# Patient Record
Sex: MALE | Race: WHITE | NOT HISPANIC OR LATINO | URBAN - NONMETROPOLITAN AREA
[De-identification: names, ages, dates, MRNs, and addresses within clinical notes are randomized per-mention and may not be internally consistent; named-entity substitution may affect disease eponyms.]

---

## 2017-04-13 ENCOUNTER — HOSPITAL ENCOUNTER (OUTPATIENT)
Dept: GENERAL RADIOLOGY | Facility: HOSPITAL | Age: 32
Discharge: HOME | End: 2017-04-13
Admitting: NURSE PRACTITIONER

## 2017-04-13 ENCOUNTER — TRANSCRIBE ORDERS (OUTPATIENT)
Dept: GENERAL RADIOLOGY | Facility: HOSPITAL | Age: 32
End: 2017-04-13

## 2017-04-13 ENCOUNTER — LAB (OUTPATIENT)
Dept: LAB | Facility: HOSPITAL | Age: 32
End: 2017-04-13

## 2017-04-13 ENCOUNTER — APPOINTMENT (OUTPATIENT)
Dept: LAB | Facility: HOSPITAL | Age: 32
End: 2017-04-13

## 2017-04-13 DIAGNOSIS — S67.02XA CRUSHING INJURY OF LEFT THUMB, INITIAL ENCOUNTER: ICD-10-CM

## 2017-04-13 DIAGNOSIS — S67.02XA: ICD-10-CM

## 2017-04-13 DIAGNOSIS — S80.10XA: ICD-10-CM

## 2017-04-13 DIAGNOSIS — S80.10XA: Primary | ICD-10-CM

## 2017-04-13 LAB
ALBUMIN SERPL-MCNC: 4.2 G/DL (ref 3.5–5)
ALBUMIN/GLOB SERPL: 1.3 G/DL (ref 1.1–2.5)
ALP SERPL-CCNC: 75 U/L (ref 24–120)
ALT SERPL W P-5'-P-CCNC: 49 U/L (ref 0–54)
ANION GAP SERPL CALCULATED.3IONS-SCNC: 9 MMOL/L (ref 4–13)
APTT PPP: 25.8 SECONDS (ref 24.1–34.8)
AST SERPL-CCNC: 46 U/L (ref 7–45)
AUTO MIXED CELLS #: 0.4 10*3/UL (ref 0.1–2.6)
AUTO MIXED CELLS %: 8.8 % (ref 0.1–24)
BILIRUB SERPL-MCNC: 0.6 MG/DL (ref 0.1–1)
BUN BLD-MCNC: 12 MG/DL (ref 5–21)
BUN/CREAT SERPL: 14.6
CALCIUM SPEC-SCNC: 9 MG/DL (ref 8.4–10.4)
CHLORIDE SERPL-SCNC: 99 MMOL/L (ref 98–110)
CO2 SERPL-SCNC: 32 MMOL/L (ref 24–31)
CREAT BLD-MCNC: 0.82 MG/DL (ref 0.5–1.4)
ERYTHROCYTE [DISTWIDTH] IN BLOOD BY AUTOMATED COUNT: 12.4 % (ref 12–15)
GFR SERPL CREATININE-BSD FRML MDRD: 111 ML/MIN/1.73
GLOBULIN UR ELPH-MCNC: 3.2 GM/DL
GLUCOSE BLD-MCNC: 81 MG/DL (ref 70–100)
HCT VFR BLD AUTO: 42.9 % (ref 40–52)
HGB BLD-MCNC: 15.1 G/DL (ref 14–18)
INR PPP: 0.85 (ref 0.91–1.09)
LYMPHOCYTES # BLD AUTO: 1.1 10*3/MM3 (ref 0.8–7)
LYMPHOCYTES NFR BLD AUTO: 27.8 % (ref 15–45)
MCH RBC QN AUTO: 33.3 PG (ref 28–32)
MCHC RBC AUTO-ENTMCNC: 35.2 G/DL (ref 33–36)
MCV RBC AUTO: 94.5 FL (ref 82–95)
NEUTROPHILS # BLD AUTO: 2.5 10*3/MM3 (ref 1.5–8.3)
NEUTROPHILS NFR BLD AUTO: 63.4 % (ref 39–78)
PLATELET # BLD AUTO: 157 10*3/MM3 (ref 130–400)
PMV BLD AUTO: 9.9 FL (ref 6–12)
POTASSIUM BLD-SCNC: 4.6 MMOL/L (ref 3.5–5.3)
PROT SERPL-MCNC: 7.4 G/DL (ref 6.3–8.7)
PROTHROMBIN TIME: 11.9 SECONDS (ref 11.9–14.6)
RBC # BLD AUTO: 4.54 10*6/MM3 (ref 4.2–5.4)
SODIUM BLD-SCNC: 140 MMOL/L (ref 135–145)
WBC NRBC COR # BLD: 4 10*3/MM3 (ref 4.8–10.8)

## 2017-04-13 PROCEDURE — 73130 X-RAY EXAM OF HAND: CPT

## 2017-04-13 PROCEDURE — 36415 COLL VENOUS BLD VENIPUNCTURE: CPT

## 2017-04-13 PROCEDURE — 85610 PROTHROMBIN TIME: CPT

## 2017-04-13 PROCEDURE — 85025 COMPLETE CBC W/AUTO DIFF WBC: CPT

## 2017-04-13 PROCEDURE — 85730 THROMBOPLASTIN TIME PARTIAL: CPT

## 2017-04-13 PROCEDURE — 80053 COMPREHEN METABOLIC PANEL: CPT

## 2021-06-06 ENCOUNTER — APPOINTMENT (OUTPATIENT)
Dept: CT IMAGING | Age: 36
End: 2021-06-06
Attending: EMERGENCY MEDICINE
Payer: OTHER GOVERNMENT

## 2021-06-06 ENCOUNTER — HOSPITAL ENCOUNTER (EMERGENCY)
Age: 36
Discharge: HOME | End: 2021-06-06
Attending: EMERGENCY MEDICINE
Payer: OTHER GOVERNMENT

## 2021-06-06 VITALS
WEIGHT: 178 LBS | BODY MASS INDEX: 26.98 KG/M2 | HEART RATE: 54 BPM | TEMPERATURE: 97.7 F | OXYGEN SATURATION: 98 % | HEIGHT: 68 IN | DIASTOLIC BLOOD PRESSURE: 89 MMHG | SYSTOLIC BLOOD PRESSURE: 142 MMHG | RESPIRATION RATE: 16 BRPM

## 2021-06-06 DIAGNOSIS — R10.12 ABDOMINAL PAIN, LUQ (LEFT UPPER QUADRANT): Primary | ICD-10-CM

## 2021-06-06 DIAGNOSIS — K29.90 GASTRITIS AND DUODENITIS: ICD-10-CM

## 2021-06-06 LAB
ALBUMIN SERPL-MCNC: 4 G/DL (ref 3.5–5)
ALBUMIN/GLOB SERPL: 1.1 {RATIO} (ref 1.2–3.5)
ALP SERPL-CCNC: 89 U/L (ref 50–136)
ALT SERPL-CCNC: 97 U/L (ref 12–65)
ANION GAP SERPL CALC-SCNC: 4 MMOL/L (ref 7–16)
AST SERPL-CCNC: 55 U/L (ref 15–37)
BASOPHILS # BLD: 0 K/UL (ref 0–0.2)
BASOPHILS NFR BLD: 1 % (ref 0–2)
BILIRUB SERPL-MCNC: 0.3 MG/DL (ref 0.2–1.1)
BUN SERPL-MCNC: 10 MG/DL (ref 6–23)
CALCIUM SERPL-MCNC: 8.8 MG/DL (ref 8.3–10.4)
CHLORIDE SERPL-SCNC: 106 MMOL/L (ref 98–107)
CO2 SERPL-SCNC: 30 MMOL/L (ref 21–32)
CREAT SERPL-MCNC: 0.9 MG/DL (ref 0.8–1.5)
DIFFERENTIAL METHOD BLD: ABNORMAL
EOSINOPHIL # BLD: 0.1 K/UL (ref 0–0.8)
EOSINOPHIL NFR BLD: 2 % (ref 0.5–7.8)
ERYTHROCYTE [DISTWIDTH] IN BLOOD BY AUTOMATED COUNT: 11.7 % (ref 11.9–14.6)
GLOBULIN SER CALC-MCNC: 3.6 G/DL (ref 2.3–3.5)
GLUCOSE SERPL-MCNC: 97 MG/DL (ref 65–100)
HCT VFR BLD AUTO: 44 % (ref 41.1–50.3)
HGB BLD-MCNC: 15 G/DL (ref 13.6–17.2)
IMM GRANULOCYTES # BLD AUTO: 0 K/UL (ref 0–0.5)
IMM GRANULOCYTES NFR BLD AUTO: 0 % (ref 0–5)
LIPASE SERPL-CCNC: 230 U/L (ref 73–393)
LYMPHOCYTES # BLD: 1.2 K/UL (ref 0.5–4.6)
LYMPHOCYTES NFR BLD: 29 % (ref 13–44)
MCH RBC QN AUTO: 32.4 PG (ref 26.1–32.9)
MCHC RBC AUTO-ENTMCNC: 34.1 G/DL (ref 31.4–35)
MCV RBC AUTO: 95 FL (ref 79.6–97.8)
MONOCYTES # BLD: 0.4 K/UL (ref 0.1–1.3)
MONOCYTES NFR BLD: 9 % (ref 4–12)
NEUTS SEG # BLD: 2.4 K/UL (ref 1.7–8.2)
NEUTS SEG NFR BLD: 58 % (ref 43–78)
NRBC # BLD: 0 K/UL (ref 0–0.2)
PLATELET # BLD AUTO: 208 K/UL (ref 150–450)
PMV BLD AUTO: 9.9 FL (ref 9.4–12.3)
POTASSIUM SERPL-SCNC: 4.2 MMOL/L (ref 3.5–5.1)
PROT SERPL-MCNC: 7.6 G/DL (ref 6.3–8.2)
RBC # BLD AUTO: 4.63 M/UL (ref 4.23–5.6)
SODIUM SERPL-SCNC: 140 MMOL/L (ref 138–145)
WBC # BLD AUTO: 4.2 K/UL (ref 4.3–11.1)

## 2021-06-06 PROCEDURE — 74011000636 HC RX REV CODE- 636: Performed by: EMERGENCY MEDICINE

## 2021-06-06 PROCEDURE — 80053 COMPREHEN METABOLIC PANEL: CPT

## 2021-06-06 PROCEDURE — 96374 THER/PROPH/DIAG INJ IV PUSH: CPT

## 2021-06-06 PROCEDURE — 99284 EMERGENCY DEPT VISIT MOD MDM: CPT

## 2021-06-06 PROCEDURE — 74176 CT ABD & PELVIS W/O CONTRAST: CPT

## 2021-06-06 PROCEDURE — 85025 COMPLETE CBC W/AUTO DIFF WBC: CPT

## 2021-06-06 PROCEDURE — 81003 URINALYSIS AUTO W/O SCOPE: CPT

## 2021-06-06 PROCEDURE — 96376 TX/PRO/DX INJ SAME DRUG ADON: CPT

## 2021-06-06 PROCEDURE — 96375 TX/PRO/DX INJ NEW DRUG ADDON: CPT

## 2021-06-06 PROCEDURE — 74011250636 HC RX REV CODE- 250/636: Performed by: EMERGENCY MEDICINE

## 2021-06-06 PROCEDURE — 83690 ASSAY OF LIPASE: CPT

## 2021-06-06 RX ORDER — OMEPRAZOLE 20 MG/1
20 CAPSULE, DELAYED RELEASE ORAL DAILY
Qty: 30 CAPSULE | Refills: 1 | Status: SHIPPED | OUTPATIENT
Start: 2021-06-06

## 2021-06-06 RX ORDER — ONDANSETRON 8 MG/1
8 TABLET, ORALLY DISINTEGRATING ORAL
Qty: 12 TABLET | Refills: 0 | Status: SHIPPED | OUTPATIENT
Start: 2021-06-06

## 2021-06-06 RX ORDER — MORPHINE SULFATE 4 MG/ML
4 INJECTION INTRAVENOUS
Status: COMPLETED | OUTPATIENT
Start: 2021-06-06 | End: 2021-06-06

## 2021-06-06 RX ORDER — SODIUM CHLORIDE 0.9 % (FLUSH) 0.9 %
5-10 SYRINGE (ML) INJECTION EVERY 8 HOURS
Status: DISCONTINUED | OUTPATIENT
Start: 2021-06-06 | End: 2021-06-07 | Stop reason: HOSPADM

## 2021-06-06 RX ORDER — SODIUM CHLORIDE 0.9 % (FLUSH) 0.9 %
5-10 SYRINGE (ML) INJECTION AS NEEDED
Status: DISCONTINUED | OUTPATIENT
Start: 2021-06-06 | End: 2021-06-07 | Stop reason: HOSPADM

## 2021-06-06 RX ORDER — ONDANSETRON 2 MG/ML
4 INJECTION INTRAMUSCULAR; INTRAVENOUS
Status: COMPLETED | OUTPATIENT
Start: 2021-06-06 | End: 2021-06-06

## 2021-06-06 RX ADMIN — MORPHINE SULFATE 4 MG: 4 INJECTION INTRAVENOUS at 18:06

## 2021-06-06 RX ADMIN — DIATRIZOATE MEGLUMINE AND DIATRIZOATE SODIUM 15 ML: 660; 100 LIQUID ORAL; RECTAL at 19:07

## 2021-06-06 RX ADMIN — MORPHINE SULFATE 4 MG: 4 INJECTION INTRAVENOUS at 20:09

## 2021-06-06 RX ADMIN — SODIUM CHLORIDE 1000 ML: 900 INJECTION, SOLUTION INTRAVENOUS at 18:06

## 2021-06-06 RX ADMIN — ONDANSETRON 4 MG: 2 INJECTION INTRAMUSCULAR; INTRAVENOUS at 18:06

## 2021-06-06 NOTE — ED TRIAGE NOTES
Pt ambulatory to triage. Pt states dx with pancreatitis last week in [place] while working. Pt states still has abd pain, n/v/d. Denies fever. Uses ETOH, usually daily and takes a few weeks off when returns home. Has no ETOH in 2-3 days. Denies withdrawal s/sx.

## 2021-06-06 NOTE — ED PROVIDER NOTES
Patient presents to the ER with complaints of abdominal pain. Patient reports recent diagnosis of alcoholic pancreatitis. This was done in outside facility. States he had been doing well but felt that his pain has been got worsened over the past 2 to 3 days. States pain in her epigastrium that radiates towards his left side. Denies any hematemesis or melena. The history is provided by the patient. Abdominal Pain   This is a new problem. The current episode started more than 1 week ago. The problem occurs constantly. The problem has been gradually worsening. The pain is located in the epigastric region and LUQ. The quality of the pain is aching and cramping. The pain is at a severity of 5/10. The pain is moderate. Associated symptoms include nausea and vomiting. Pertinent negatives include no anorexia, no fever, no flatus, no melena, no headaches, no chest pain and no testicular pain. The pain is worsened by drinking alcohol. The pain is relieved by nothing. His past medical history is significant for pancreatitis. No past medical history on file. No past surgical history on file. No family history on file. Social History     Socioeconomic History    Marital status: SINGLE     Spouse name: Not on file    Number of children: Not on file    Years of education: Not on file    Highest education level: Not on file   Occupational History    Not on file   Tobacco Use    Smoking status: Not on file   Substance and Sexual Activity    Alcohol use: Not on file    Drug use: Not on file    Sexual activity: Not on file   Other Topics Concern    Not on file   Social History Narrative    Not on file     Social Determinants of Health     Financial Resource Strain:     Difficulty of Paying Living Expenses:    Food Insecurity:     Worried About Running Out of Food in the Last Year:     [address] in the Last Year:    Transportation Needs:     Lack of Transportation (Medical):      Lack of Transportation (Non-Medical):    Physical Activity:     Days of Exercise per Week:     Minutes of Exercise per Session:    Stress:     Feeling of Stress :    Social Connections:     Frequency of Communication with Friends and Family:     Frequency of Social Gatherings with Friends and Family:     Attends [employer] Services:     Active Member of Clubs or Organizations:     Attends Club or Organization Meetings:     Marital Status:    Intimate Partner Violence:     Fear of Current or Ex-Partner:     Emotionally Abused:     Physically Abused:     Sexually Abused: ALLERGIES: Iodinated contrast media    Review of Systems   Constitutional: Negative for fatigue and fever. HENT: Negative for congestion, trouble swallowing and voice change. Eyes: Negative for photophobia and redness. Respiratory: Negative for chest tightness and shortness of breath. Cardiovascular: Negative for chest pain, palpitations and leg swelling. Gastrointestinal: Positive for abdominal pain, nausea and vomiting. Negative for anal bleeding, anorexia, flatus and melena. Endocrine: Negative for heat intolerance, polyphagia and polyuria. Genitourinary: Negative for flank pain, testicular pain and urgency. Musculoskeletal: Negative for gait problem and joint swelling. Skin: Negative for color change and pallor. Neurological: Negative for syncope, speech difficulty, weakness, light-headedness and headaches. Hematological: Negative for adenopathy. Does not bruise/bleed easily. Psychiatric/Behavioral: Negative for behavioral problems and confusion. All other systems reviewed and are negative. Vitals:    06/06/21 1638   BP: 123/67   Pulse: 67   Resp: 16   Temp: 97.7 °F (36.5 °C)   SpO2: 98%   Weight: 80.7 kg (178 lb)   Height: 5' 8\" (1.727 m)            Physical Exam  Vitals and nursing note reviewed. Constitutional:       General: He is not in acute distress. Appearance: Normal appearance.  He is not ill-appearing. HENT:      Head: Normocephalic and atraumatic. Nose: Nose normal. No congestion or rhinorrhea. Mouth/Throat:      Mouth: Mucous membranes are moist.      Pharynx: No oropharyngeal exudate or posterior oropharyngeal erythema. Eyes:      Extraocular Movements: Extraocular movements intact. Pupils: Pupils are equal, round, and reactive to light. Cardiovascular:      Rate and Rhythm: Normal rate and regular rhythm. Pulses: Normal pulses. Heart sounds: Normal heart sounds. No murmur heard. No friction rub. No gallop. Pulmonary:      Effort: Pulmonary effort is normal. No respiratory distress. Breath sounds: Normal breath sounds. No stridor. No wheezing. Abdominal:      General: Abdomen is flat. Palpations: There is no mass. Tenderness: There is abdominal tenderness. There is no guarding. Musculoskeletal:         General: No swelling, tenderness or deformity. Normal range of motion. Cervical back: Normal range of motion and neck supple. No rigidity or tenderness. Skin:     General: Skin is warm. Capillary Refill: Capillary refill takes less than 2 seconds. Coloration: Skin is not jaundiced or pale. Neurological:      General: No focal deficit present. Mental Status: He is alert and oriented to person, place, and time. Cranial Nerves: No cranial nerve deficit. Sensory: No sensory deficit. Motor: No weakness. Psychiatric:         Mood and Affect: Mood normal.         Behavior: Behavior normal.          MDM  Number of Diagnoses or Management Options  Diagnosis management comments: Will obtain labs here including lipase. Treat symptomatically      9:06 PM  Normal basic labs here. Lipase normal as well    Chemistry panel stable.     CT scan of abdomen pelvis shows no acute abnormalities    Plan to discharge home, given alcohol gastritis precautions       Amount and/or Complexity of Data Reviewed  Clinical lab tests: ordered and reviewed  Tests in the radiology section of CPT®: ordered and reviewed  Review and summarize past medical records: yes  Independent visualization of images, tracings, or specimens: yes    Risk of Complications, Morbidity, and/or Mortality  Presenting problems: moderate  Diagnostic procedures: moderate  Management options: high  General comments: High risk due to use of parenteral narcotics    Patient Progress  Patient progress: stable         Procedures          Results Include:    Recent Results (from the past 24 hour(s))   CBC WITH AUTOMATED DIFF    Collection Time: 06/06/21  4:41 PM   Result Value Ref Range    WBC 4.2 (L) 4.3 - 11.1 K/uL    RBC 4.63 4.23 - 5.6 M/uL    HGB 15.0 13.6 - 17.2 g/dL    HCT 44.0 41.1 - 50.3 %    MCV 95.0 79.6 - 97.8 FL    MCH 32.4 26.1 - 32.9 PG    MCHC 34.1 31.4 - 35.0 g/dL    RDW 11.7 (L) 11.9 - 14.6 %    PLATELET 954 583 - 323 K/uL    MPV 9.9 9.4 - 12.3 FL    ABSOLUTE NRBC 0.00 0.0 - 0.2 K/uL    DF AUTOMATED      NEUTROPHILS 58 43 - 78 %    LYMPHOCYTES 29 13 - 44 %    MONOCYTES 9 4.0 - 12.0 %    EOSINOPHILS 2 0.5 - 7.8 %    BASOPHILS 1 0.0 - 2.0 %    IMMATURE GRANULOCYTES 0 0.0 - 5.0 %    ABS. NEUTROPHILS 2.4 1.7 - 8.2 K/UL    ABS. LYMPHOCYTES 1.2 0.5 - 4.6 K/UL    ABS. MONOCYTES 0.4 0.1 - 1.3 K/UL    ABS. EOSINOPHILS 0.1 0.0 - 0.8 K/UL    ABS. BASOPHILS 0.0 0.0 - 0.2 K/UL    ABS. IMM. GRANS. 0.0 0.0 - 0.5 K/UL   METABOLIC PANEL, COMPREHENSIVE    Collection Time: 06/06/21  4:41 PM   Result Value Ref Range    Sodium 140 138 - 145 mmol/L    Potassium 4.2 3.5 - 5.1 mmol/L    Chloride 106 98 - 107 mmol/L    CO2 30 21 - 32 mmol/L    Anion gap 4 (L) 7 - 16 mmol/L    Glucose 97 65 - 100 mg/dL    BUN 10 6 - 23 MG/DL    Creatinine 0.90 0.8 - 1.5 MG/DL    GFR est AA >60 >60 ml/min/1.73m2    GFR est non-AA >60 >60 ml/min/1.73m2    Calcium 8.8 8.3 - 10.4 MG/DL    Bilirubin, total 0.3 0.2 - 1.1 MG/DL    ALT (SGPT) 97 (H) 12 - 65 U/L    AST (SGOT) 55 (H) 15 - 37 U/L    Alk. phosphatase 89 50 - 136 U/L    Protein, total 7.6 6.3 - 8.2 g/dL    Albumin 4.0 3.5 - 5.0 g/dL    Globulin 3.6 (H) 2.3 - 3.5 g/dL    A-G Ratio 1.1 (L) 1.2 - 3.5     LIPASE    Collection Time: 06/06/21  4:41 PM   Result Value Ref Range    Lipase 230 73 - 393 U/L     Voice dictation software was used during the making of this note. This software is not perfect and grammatical and other typographical errors may be present. This note has been proofread, but may still contain errors.   Perry Reese MD; 6/6/2021 @9:06 PM   ===================================================================

## 2021-06-07 NOTE — DISCHARGE INSTRUCTIONS
Take medications as prescribed  Call and arrange follow-up with your primary care physician  Return to the ER for any new, worsening or life-threatening symptoms

## 2021-06-07 NOTE — ED NOTES
I have reviewed discharge instructions with the patient. The patient verbalized understanding. Patient left ED via Discharge Method: ambulatory to Home. Opportunity for questions and clarification provided. Patient given 2 scripts. To continue your aftercare when you leave the hospital, you may receive an automated call from our care team to check in on how you are doing. This is a free service and part of our promise to provide the best care and service to meet your aftercare needs.  If you have questions, or wish to unsubscribe from this service please call [phone]. Thank you for Choosing our [facility].

## 2022-05-12 PROBLEM — M25.312 INSTABILITY OF LEFT SHOULDER JOINT: Status: ACTIVE | Noted: 2022-05-12

## 2022-06-03 PROBLEM — M25.512 LEFT SHOULDER PAIN: Status: ACTIVE | Noted: 2022-06-03

## 2022-06-24 ENCOUNTER — OFFICE VISIT (OUTPATIENT)
Dept: ORTHOPEDIC SURGERY | Age: 37
End: 2022-06-24
Payer: OTHER GOVERNMENT

## 2022-06-24 DIAGNOSIS — M25.512 LEFT SHOULDER PAIN, UNSPECIFIED CHRONICITY: ICD-10-CM

## 2022-06-24 DIAGNOSIS — M25.312 INSTABILITY OF LEFT SHOULDER JOINT: Primary | ICD-10-CM

## 2022-06-24 PROCEDURE — 99024 POSTOP FOLLOW-UP VISIT: CPT | Performed by: SPECIALIST/TECHNOLOGIST

## 2022-06-24 PROCEDURE — L3650 SO 8 ABD RESTRAINT PRE OTS: HCPCS | Performed by: SPECIALIST/TECHNOLOGIST

## 2022-06-24 RX ORDER — CITALOPRAM 40 MG/1
40 TABLET ORAL DAILY
COMMUNITY
Start: 2013-05-01

## 2022-06-24 RX ORDER — ACETAMINOPHEN 500 MG
500 TABLET ORAL EVERY 6 HOURS PRN
COMMUNITY

## 2022-06-24 RX ORDER — APIXABAN 5 MG/1
5 TABLET, FILM COATED ORAL 2 TIMES DAILY
COMMUNITY
Start: 2022-03-31

## 2022-06-24 RX ORDER — DIVALPROEX SODIUM 500 MG/1
500 TABLET, EXTENDED RELEASE ORAL
COMMUNITY
Start: 2016-08-01

## 2022-06-24 RX ORDER — OXYCODONE HYDROCHLORIDE 5 MG/1
5 TABLET ORAL EVERY 4 HOURS PRN
Qty: 30 TABLET | Refills: 0 | Status: SHIPPED | OUTPATIENT
Start: 2022-06-24 | End: 2022-06-29

## 2022-06-24 NOTE — PERIOP NOTE
Patient verified name and . Order for consent NOT found in EHR; patient verifies procedure from case posting. Type 1B surgery, phone assessment complete. Orders NOT received. Labs per surgeon: Unknown  Labs per anesthesia protocol: None per protocol    Patient was told by Hematologist to take last dose of Eliquis  night 2022, remain off Eliquis , , then Wednesday the day of surgery to take night time dose of Eliquis in the evening of 2022. Patient verbalizes understanding of all hematology instruction. Patient answered medical/surgical history questions at their best of ability. All prior to admission medications documented in [facility]. Patient instructed to take the following medications the day of surgery according to anesthesia guidelines with a small sip of water: citalopram, divalproex and oxycodone if needed day of surgery. On the day before surgery please take Acetaminophen 1000mg in the morning and then again before bed. You may substitute for Tylenol 650 mg. Hold all vitamins 7 days prior to surgery and NSAIDS 5 days prior to surgery. Prescription meds to hold: Eliquis (see above note for patient instructions from hematology). Patient instructed on the following:    > Arrive at [facility], time of arrival to be called the day before by 1700  > NPO after midnight, unless otherwise indicated, including gum, mints, and ice chips  > Responsible adult must drive patient to the hospital, stay during surgery, and patient will need supervision 24 hours after anesthesia  > Use antibacterial soap in shower the night before surgery and on the morning of surgery  > All piercings must be removed prior to arrival.    > Leave all valuables (money and jewelry) at home but bring insurance card and ID on DOS.   > You may be required to pay a deductible or co-pay on the day of your procedure.  You can pre-pay by calling [phone] if your surgery is at the [address] or [phone] if your surgery is at the [facility]. > Do not wear make-up, nail polish, lotions, cologne, perfumes, powders, or oil on skin. Artificial nails are not permitted. Emailed patient instruction note to: [email or web address]

## 2022-06-24 NOTE — PERIOP NOTE
Dear Olive Yashira Canales,      Thank you for completing your phone assessment with me today. Here are your requested surgery instructions. Please call #[phone] with any questions/concerns. Your surgery is scheduled at [facility]: [address], [place], [address]. Please arrive at Outpatient Entrance; pre-op (#[phone]) will call you on the business day before your surgery with your arrival time. If you have any questions on the day of surgery, please call the pre-op dept. at the telephone number above. If you are sick the day of surgery: fever >100 deg F, coughing up colored mucus, or have abdominal sickness (intractable nausea, vomiting or diarrhea) please call [phone] the day of surgery as early as possible and speak to a nurse about your symptoms. They will advise you on next steps. No food or drink after midnight which includes any gum, mints, candy, or ice chips. Please take these medications on the morning of surgery with a small sip of water: citalopram, depakote, and oxycodone if needed day of surgery and you can tolerate on empty stomach. On the day before surgery take Acetaminophen 1000mg in the morning and at bedtime OR Acetaminophen 650mg in the morning, afternoon and bedtime. Please stop all vitamins/supplements 7 days prior to surgery and stop all NSAIDS (ibuprofen, naproxen, aleve, motrin, advil) 5 days before your surgery. A responsible adult must drive you to the hospital, remain in the building during surgery and you will need adult supervision for 24 hours after anesthesia. Please use an antibacterial soap (Dial, Safeguard, etc.) the night before surgery and on the morning of surgery. Do NOT wear: deodorant, make-up, nail polish, lotions, cologne, perfumes, powders or oil on your skin.  All piercings/metal/jewelry must be removed prior to arrival.  If you wear contacts then you will need to bring a case to store them in or wear your glasses. Please leave all your valuables at home but be sure to bring your insurance card and ID on the day of surgery for registration/identification. Our Guide to Surgery with additional information can be found:  [email or web address]    Emailed to: [email or web address]

## 2022-06-24 NOTE — LETTER
DME Patient Authorization Form    Name: Jocelyn Tabares  : 1985  MRN: [id number]   Age: [address] y.o. Gender: male  Delivery Address: [facility]     Diagnosis:     ICD-10-CM    1. Instability of left shoulder joint  M25.312    2. Left shoulder pain, unspecified chronicity  M25.512         Requested DME:  Shoulder ImmobIlizer ()        Clinical Notes:     **Indicates non-covered items by insurance. Payment expected on date of service. Electronically signed by  Provider: _____________JAVY Cuello_____________ Date: 2022                             [facility] Tax ID # [id number]        Durable Medical Equipment and/or Orthotics Patient Consent     I understand that my physician has prescribed this medical supply as part of my treatment plan as a matter of Medical Necessity.  I understand that I have a choice in where I receive my prescribed orthopedic supplies and/or services.  I authorize [facility] to furnish this service/product and to provide my insurance carrier with any information requested in order to process for payment.  I instruct my insurance carrier to pay [facility] directly for these services/products.  I understand that my insurance carrier may deny payment for this supply because it is a non-covered item, deemed not medically necessary or considered experimental.   I understand that any cost not covered by my insurance carrier will be solely my financial responsibility.  I have received the Supplier Standards and have reviewed them.  I have received the prescribed item and have been fully instructed on the proper use of the above services/products.    ______ (Patient Initials) I understand that all DME items are non-returnable after being dispensed. Items still in sealed packaging may be returned up to 14 days after purchasing.  [facility] [address] will replace items that are defective.    ______ (Patient Initials) I understand that Walker Carrillo will not file a claim with my insurance carrier for this service/product and I am waiving my right to file a claim on my own for this service/product with my insurance company as this item is NON-COVERED (Denoted by the **) by my Insurance company/policy. ______ (Patient Initials) I understand that I am responsible to bring my equipment to the hospital for any surgery. ______________________________________________  ________________________  Patient / Fanny Goel            Thank you for considering [address] Your physician has prescribed specific medical equipment or devices for your home use. The following describes your rights and responsibilities as our customer. Right to Choose Providers: You have a choice regarding which company supplies your home medical equipment and devices, and to consult your physician in this decision. You may choose a medical supply store, a home medical equipment provider, or a specialist such as [facility]. [facility] will coordinate with your physician to provide the medical equipment or devices prescribed for your home use. Right to Service:  You have the right to considerate, respectful and nondiscriminatory care. You have the right to receive accurate and easily understood information about your health care. If you speak a foreign language, or don't understand the discussions, assistance will be provided to allow you to make informed health care decisions. You have the right to know your treatment options and to participate in decisions about your care, including the right to accept or refuse treatment. You have the right to expect a reasonable response to your requests for treatment or service.   You have the right to talk in confidence with health care providers and to have your health care information protected. You have the right to receive an explanation of your bill. You have the right to complain about the service or product you receive. Patient Responsibilities:  Please provide complete and accurate information about your health insurance benefits and make arrangements for the timely payment of your bill. [facility] will, if possible, assume responsibility for billing your insurance (Medicare, Medicaid and commercial) for the prescribed equipment or devices. If your policy does not cover the prescribed product, or only covers a portion of the bill, you are responsible for any remaining balance. Return and Exchange Policy:  [facility] will honor published  Warranties for products. [facility] will accept returns or exchanges within 14 days from the date of receipt, providin) the product must be in new condition; 2) receipt as required; and 3) used disposable and hygiene products may only be returned due to a defective product. Note: Refunds will be issued in a timely manner, please allow 4-6 weeks for processing. Complaint Procedures and DME Consumer Protection Resources:  [facility] values you as a customer, and is committed to resolving patient concerns. This commitment includes understanding and documenting your concerns, conducting a review of internal procedures, and providing you with an explanation and resolution to your concerns. Should you have any questions about our services or billing process, please contact our office at (practice phone number). If we are unable to resolve the concern, you have the right to direct comments to the office of Consumer Protection, in the [address] or the [facility] office, without fear of repercussion.     DMEPOS SUPPLIER STANDARDS    A supplier must be in compliance with all applicable Federal and [place] and regulatory requirements. A supplier must provide complete and accurate information on the DMEPOS supplier application. Any changes to this information must be reported to the [facility] within 30 days. An authorized individual (one whose signature is binding) must sign the application for billing privileges. A supplier must fill orders from its own inventory, or must contract with other companies for the purchase of items necessary to fill the order. A supplier may not contract with any entity that is currently excluded from the Medicare program, any [facility] program, or from any other Federal procurement or Nonprocurement programs. A supplier must advise beneficiaries that they may rent or purchase inexpensive or routinely purchased durable medical equipment, and of the purchase option for capped rental equipment. A supplier must notify beneficiaries of warranty coverage and honor all warranties under applicable State Law, and repair or replace free of charge Medicare covered items that are under warranty. A supplier must maintain a physical facility on an appropriate site. A supplier must permit CMS, or its agents to conduct on-site inspections to ascertain the supplier's compliance with these standards. The supplier location must be accessible to beneficiaries during reasonable business hours, and must maintain a visible sign and posted hours of operation. A supplier must maintain a primary business telephone listed under the name of the business in a [employer] or a toll free number available through directory assistance. The exclusive use of a beeper, answering machine or cell phone is prohibited. A supplier must have comprehensive liability insurance in the amount of at least $300,000 that covers both the supplier's place of business and all customers and employees of the supplier.   If the supplier manufactures its own items, this insurance must also cover product liability and completed operations. A supplier must agree not to initiate telephone contact with beneficiaries, with a few exceptions allowed. This standard prohibits suppliers from calling beneficiaries in order to solicit new business. A supplier is responsible for delivery and must instruct beneficiaries on use of Medicare covered items, and maintain proof of delivery. A supplier must answer questions, and respond to complaints of the beneficiaries, and maintain documentation of such contacts. A supplier must maintain and replace at no charge or repair directly, or through a service contract with another company, Medicare covered items it has rented to beneficiaries. A supplier must accept returns of substandard (less than full quality for the particular item) or unsuitable items (inappropriate for the beneficiary at the time it was fitted and rented or sold) from beneficiaries. A supplier must disclose these supplier standards to each beneficiary to whom it supplies a Medicare-covered item. A supplier must disclose to the government any person having ownership, financial, or control interest in the supplier. A supplier must not convey or reassign a supplier number; i.e., the supplier may not sell or allow another entity to use its Medicare billing number. A supplier must have a complaint resolution protocol established to address beneficiary complaints that relate to these standards. A record of these complaints must be maintained at the physical facility. Complaint records must include: the name, address, telephone number and health insurance claim number of the beneficiary, a summary of the complaint, and any action taken to resolve it. A supplier must agree to furnish CMS any information required by the Medicare statute and implementing regulations.   A supplier of DMEPOS and other items and services must be accredited by a CMS-approved accreditation organization in order to receive and retain a supplier billing number. The accreditation must indicate the specific products and services, for which the supplier is accredited in order for the supplier to receive payment for those specific products and services. A DMEPOS supplier must notify their accreditation organization when a new DMEPOS location is opened. The accreditation organization may accredit the new supplier location for three months after it is operational without requiring a new site visit. All DMEPOS supplier locations, whether owned or subcontracted, must meet the [employer] and be separately accredited in order to bill Medicare. An accredited supplier may be denied enrollment or their enrollment may be revoked, if CMS determines that they are not in compliance with the DMEPOS quality standards. A DMEPOS supplier must disclose upon enrollment all products and services, including the addition of new product lines for which they are seeking accreditation. If a new product line is added after enrollment, the DMEPOS supplier will be responsible for notifying the accrediting body of the new product so that the DMEPOS supplier can be re-surveyed and accredited for these new products. Must meet the surety bond requirements specified in 42 C. F.R. 424.57(c). Implementation date- May 4, 2009. A supplier must obtain oxygen from a state-licensed oxygen supplier. A supplier must maintain ordering and referring documentation consistent with provisions found in 42 C. F.R. 424.516(f). DMEPOS suppliers are prohibited from sharing a practice location with certain other Medicare providers and suppliers. DMEPOS suppliers must remain open to the public for a minimum of 30 hours per week with certain exceptions.

## 2022-06-24 NOTE — H&P (VIEW-ONLY)
of Exercise per Week: Not on file    Minutes of Exercise per Session: Not on file   Stress:     Feeling of Stress : Not on file   Social Connections:     Frequency of Communication with Friends and Family: Not on file    Frequency of Social Gatherings with Friends and Family: Not on file    Attends Judaism Services: Not on file    Active Member of [address] [place] [address] [employer] or Organizations: Not on file    Attends Club or Organization Meetings: Not on file    Marital Status: Not on file   Intimate Partner Violence:     Fear of Current or Ex-Partner: Not on file    Emotionally Abused: Not on file    Physically Abused: Not on file    Sexually Abused: Not on file   Housing Stability:     Unable to Pay for Housing in the Last Year: Not on file    Number of [place] in the Last Year: Not on file    Unstable Housing in the Last Year: Not on file           Physical Examination:  General: no acute distress  Lungs: breathing easily, CTAB  HEENT: NCAT  Abdomen: soft, non-tender  CV: regular rhythm by pulse, S1/S2 to auscultation  Left Shoulder: Tenderness to palpation of the anterior shoulder around the bicipital groove. Active forward flexion to 170 degrees with pain in the extreme. External rotation with elbows at side equal bilaterally. External rotation with elbows at side resisted range of motion 5/5 strength. Negative empty can test with 5/5 strength in the empty can position. Positive apprehension, Lyly relocation. Positive sulcus sign. Positive Centre's. Assessment:     ICD-10-CM    1. Instability of left shoulder joint  M25.312 Shoulder ImmobIlizer ()     External Referral to Physical Therapy   2. Left shoulder pain, unspecified chronicity  M25.512 Shoulder ImmobIlizer ()     External Referral to Physical Therapy       Plan:   Surgical plan will be for left shoulder arthroscopy with stabilization/labral repair.   He has been seen by hematology and the plan is to discontinue his Eliquis 72 hours prior to surgery while resuming Eliquis the night of surgery. Discussed with the patient the recovery and rehab involved with the procedure. I explained the importance of bringing all equipment with him to the day of surgery for placement in the OR. I will send the patient a postoperative pain prescription and instructed him to pick it up within the next 5 days to prevent expiration. We discussed the risks of the procedure including but not limited to infection, bleeding, anesthetic complications postoperative DVT/PE.   All of his questions have been answered and they elect to proceed as planned            AJVY Jacobson  Orthopaedics and Sports Medicine

## 2022-06-24 NOTE — PROGRESS NOTES
Name: Saida Collado  YOB: 1985  Gender: male  MRN: [id number]      CC: Follow-up (L shoulder pre-op)       HPI: Saida Collado is a [address] y.o. male who returns for follow up preoperative appointment on left shoulder pain. He is scheduled for a labral repair and capsulorrhaphy with Dr. Abner Guadalupe on 06/29/2022. Ports continued shoulder pain and instability and would like to proceed with surgery. Current Outpatient Medications:     citalopram (CELEXA) 40 MG tablet, Take 40 mg by mouth, Disp: , Rfl:     divalproex (DEPAKOTE ER) 500 MG extended release tablet, Take 500 mg by mouth, Disp: , Rfl:     ELIQUIS 5 MG TABS tablet, , Disp: , Rfl:     omeprazole (PRILOSEC) 20 MG delayed release capsule, Take 20 mg by mouth daily, Disp: , Rfl:     ondansetron (ZOFRAN-ODT) 8 MG TBDP disintegrating tablet, Take 8 mg by mouth every 8 hours as needed, Disp: , Rfl:   Not on File  No past medical history on file. No past surgical history on file. No family history on file. Social History     Socioeconomic History    Marital status: Single     Spouse name: Not on file    Number of children: Not on file    Years of education: Not on file    Highest education level: Not on file   Occupational History    Not on file   Tobacco Use    Smoking status: Never Smoker    Smokeless tobacco: Never Used   Substance and Sexual Activity    Alcohol use: Not on file    Drug use: Not on file    Sexual activity: Not on file   Other Topics Concern    Not on file   Social History Narrative    Not on file     Social Determinants of Health     Financial Resource Strain:     Difficulty of Paying Living Expenses: Not on file   Food Insecurity:     Worried About Running Out of Food in the Last Year: Not on file    Dennise of Food in the Last Year: Not on file   Transportation Needs:     Lack of Transportation (Medical): Not on file    Lack of Transportation (Non-Medical):  Not on file   Physical Activity:     Days of Exercise per Week: Not on file    Minutes of Exercise per Session: Not on file   Stress:     Feeling of Stress : Not on file   Social Connections:     Frequency of Communication with Friends and Family: Not on file    Frequency of Social Gatherings with Friends and Family: Not on file    Attends Cheondoism Services: Not on file    Active Member of [address] [place] [address] [employer] or Organizations: Not on file    Attends Club or Organization Meetings: Not on file    Marital Status: Not on file   Intimate Partner Violence:     Fear of Current or Ex-Partner: Not on file    Emotionally Abused: Not on file    Physically Abused: Not on file    Sexually Abused: Not on file   Housing Stability:     Unable to Pay for Housing in the Last Year: Not on file    Number of [place] in the Last Year: Not on file    Unstable Housing in the Last Year: Not on file           Physical Examination:  General: no acute distress  Lungs: breathing easily, CTAB  HEENT: NCAT  Abdomen: soft, non-tender  CV: regular rhythm by pulse, S1/S2 to auscultation  Left Shoulder: Tenderness to palpation of the anterior shoulder around the bicipital groove. Active forward flexion to 170 degrees with pain in the extreme. External rotation with elbows at side equal bilaterally. External rotation with elbows at side resisted range of motion 5/5 strength. Negative empty can test with 5/5 strength in the empty can position. Positive apprehension, Lyly relocation. Positive sulcus sign. Positive Patrick's. Assessment:     ICD-10-CM    1. Instability of left shoulder joint  M25.312 Shoulder ImmobIlizer ()     External Referral to Physical Therapy   2. Left shoulder pain, unspecified chronicity  M25.512 Shoulder ImmobIlizer ()     External Referral to Physical Therapy       Plan:   Surgical plan will be for left shoulder arthroscopy with stabilization/labral repair.   He has been seen by hematology and the plan is to discontinue his Eliquis 72

## 2022-06-28 ENCOUNTER — ANESTHESIA EVENT (OUTPATIENT)
Dept: SURGERY | Age: 37
End: 2022-06-28
Payer: OTHER GOVERNMENT

## 2022-06-29 ENCOUNTER — ANESTHESIA (OUTPATIENT)
Dept: SURGERY | Age: 37
End: 2022-06-29
Payer: OTHER GOVERNMENT

## 2022-06-29 ENCOUNTER — HOSPITAL ENCOUNTER (OUTPATIENT)
Age: 37
Setting detail: OUTPATIENT SURGERY
Discharge: HOME | End: 2022-06-29
Attending: ORTHOPAEDIC SURGERY | Admitting: ORTHOPAEDIC SURGERY
Payer: OTHER GOVERNMENT

## 2022-06-29 VITALS
SYSTOLIC BLOOD PRESSURE: 128 MMHG | TEMPERATURE: 98.3 F | WEIGHT: 180 LBS | DIASTOLIC BLOOD PRESSURE: 78 MMHG | HEART RATE: 97 BPM | HEIGHT: 68 IN | OXYGEN SATURATION: 95 % | BODY MASS INDEX: 27.28 KG/M2 | RESPIRATION RATE: 16 BRPM

## 2022-06-29 PROCEDURE — 6360000002 HC RX W HCPCS: Performed by: ANESTHESIOLOGY

## 2022-06-29 PROCEDURE — 2580000003 HC RX 258: Performed by: ANESTHESIOLOGY

## 2022-06-29 PROCEDURE — 29806 SHO ARTHRS SRG CAPSULORRAPHY: CPT | Performed by: ORTHOPAEDIC SURGERY

## 2022-06-29 PROCEDURE — 7100000001 HC PACU RECOVERY - ADDTL 15 MIN: Performed by: ORTHOPAEDIC SURGERY

## 2022-06-29 PROCEDURE — 6370000000 HC RX 637 (ALT 250 FOR IP): Performed by: ANESTHESIOLOGY

## 2022-06-29 PROCEDURE — 2720000010 HC SURG SUPPLY STERILE: Performed by: ORTHOPAEDIC SURGERY

## 2022-06-29 PROCEDURE — 2500000003 HC RX 250 WO HCPCS

## 2022-06-29 PROCEDURE — 7100000000 HC PACU RECOVERY - FIRST 15 MIN: Performed by: ORTHOPAEDIC SURGERY

## 2022-06-29 PROCEDURE — 76942 ECHO GUIDE FOR BIOPSY: CPT | Performed by: ANESTHESIOLOGY

## 2022-06-29 PROCEDURE — 6360000002 HC RX W HCPCS: Performed by: SPECIALIST/TECHNOLOGIST

## 2022-06-29 PROCEDURE — 3700000000 HC ANESTHESIA ATTENDED CARE: Performed by: ORTHOPAEDIC SURGERY

## 2022-06-29 PROCEDURE — 7100000011 HC PHASE II RECOVERY - ADDTL 15 MIN: Performed by: ORTHOPAEDIC SURGERY

## 2022-06-29 PROCEDURE — 3600000004 HC SURGERY LEVEL 4 BASE: Performed by: ORTHOPAEDIC SURGERY

## 2022-06-29 PROCEDURE — 2709999900 HC NON-CHARGEABLE SUPPLY: Performed by: ORTHOPAEDIC SURGERY

## 2022-06-29 PROCEDURE — C1713 ANCHOR/SCREW BN/BN,TIS/BN: HCPCS | Performed by: ORTHOPAEDIC SURGERY

## 2022-06-29 PROCEDURE — 3700000001 HC ADD 15 MINUTES (ANESTHESIA): Performed by: ORTHOPAEDIC SURGERY

## 2022-06-29 PROCEDURE — 7100000010 HC PHASE II RECOVERY - FIRST 15 MIN: Performed by: ORTHOPAEDIC SURGERY

## 2022-06-29 PROCEDURE — 3600000014 HC SURGERY LEVEL 4 ADDTL 15MIN: Performed by: ORTHOPAEDIC SURGERY

## 2022-06-29 PROCEDURE — 6360000002 HC RX W HCPCS

## 2022-06-29 DEVICE — SUTURE ANCHOR 2.4MM HIP PEEK PUSHLOCK
Type: IMPLANTABLE DEVICE | Site: SHOULDER | Status: FUNCTIONAL
Brand: ARTHREX®

## 2022-06-29 DEVICE — ANCHOR SUT L12.5MM DIA2.9MM SHT SHLDR BIOCOMPOSITE PUSHLOCK: Type: IMPLANTABLE DEVICE | Site: SHOULDER | Status: FUNCTIONAL

## 2022-06-29 RX ORDER — EPHEDRINE SULFATE/0.9% NACL/PF 50 MG/5 ML
SYRINGE (ML) INTRAVENOUS PRN
Status: DISCONTINUED | OUTPATIENT
Start: 2022-06-29 | End: 2022-06-29 | Stop reason: SDUPTHER

## 2022-06-29 RX ORDER — GLYCOPYRROLATE 0.2 MG/ML
INJECTION INTRAMUSCULAR; INTRAVENOUS PRN
Status: DISCONTINUED | OUTPATIENT
Start: 2022-06-29 | End: 2022-06-29 | Stop reason: SDUPTHER

## 2022-06-29 RX ORDER — ROPIVACAINE HYDROCHLORIDE 5 MG/ML
INJECTION, SOLUTION EPIDURAL; INFILTRATION; PERINEURAL
Status: COMPLETED | OUTPATIENT
Start: 2022-06-29 | End: 2022-06-29

## 2022-06-29 RX ORDER — SODIUM CHLORIDE 0.9 % (FLUSH) 0.9 %
5-40 SYRINGE (ML) INJECTION EVERY 12 HOURS SCHEDULED
Status: DISCONTINUED | OUTPATIENT
Start: 2022-06-29 | End: 2022-06-29 | Stop reason: HOSPADM

## 2022-06-29 RX ORDER — SODIUM CHLORIDE 0.9 % (FLUSH) 0.9 %
5-40 SYRINGE (ML) INJECTION PRN
Status: DISCONTINUED | OUTPATIENT
Start: 2022-06-29 | End: 2022-06-29 | Stop reason: HOSPADM

## 2022-06-29 RX ORDER — MIDAZOLAM HYDROCHLORIDE 2 MG/2ML
2 INJECTION, SOLUTION INTRAMUSCULAR; INTRAVENOUS
Status: COMPLETED | OUTPATIENT
Start: 2022-06-29 | End: 2022-06-29

## 2022-06-29 RX ORDER — SODIUM CHLORIDE 9 MG/ML
INJECTION, SOLUTION INTRAVENOUS PRN
Status: DISCONTINUED | OUTPATIENT
Start: 2022-06-29 | End: 2022-06-29 | Stop reason: HOSPADM

## 2022-06-29 RX ORDER — FENTANYL CITRATE 50 UG/ML
INJECTION, SOLUTION INTRAMUSCULAR; INTRAVENOUS PRN
Status: DISCONTINUED | OUTPATIENT
Start: 2022-06-29 | End: 2022-06-29 | Stop reason: SDUPTHER

## 2022-06-29 RX ORDER — LIDOCAINE HYDROCHLORIDE 20 MG/ML
INJECTION, SOLUTION EPIDURAL; INFILTRATION; INTRACAUDAL; PERINEURAL PRN
Status: DISCONTINUED | OUTPATIENT
Start: 2022-06-29 | End: 2022-06-29 | Stop reason: SDUPTHER

## 2022-06-29 RX ORDER — OXYCODONE HYDROCHLORIDE 5 MG/1
5 TABLET ORAL
Status: COMPLETED | OUTPATIENT
Start: 2022-06-29 | End: 2022-06-29

## 2022-06-29 RX ORDER — DIPHENHYDRAMINE HYDROCHLORIDE 50 MG/ML
12.5 INJECTION INTRAMUSCULAR; INTRAVENOUS
Status: DISCONTINUED | OUTPATIENT
Start: 2022-06-29 | End: 2022-06-29 | Stop reason: HOSPADM

## 2022-06-29 RX ORDER — ONDANSETRON 2 MG/ML
INJECTION INTRAMUSCULAR; INTRAVENOUS PRN
Status: DISCONTINUED | OUTPATIENT
Start: 2022-06-29 | End: 2022-06-29 | Stop reason: SDUPTHER

## 2022-06-29 RX ORDER — KETOROLAC TROMETHAMINE 30 MG/ML
INJECTION, SOLUTION INTRAMUSCULAR; INTRAVENOUS PRN
Status: DISCONTINUED | OUTPATIENT
Start: 2022-06-29 | End: 2022-06-29 | Stop reason: SDUPTHER

## 2022-06-29 RX ORDER — LABETALOL HYDROCHLORIDE 5 MG/ML
10 INJECTION, SOLUTION INTRAVENOUS
Status: DISCONTINUED | OUTPATIENT
Start: 2022-06-29 | End: 2022-06-29 | Stop reason: HOSPADM

## 2022-06-29 RX ORDER — HALOPERIDOL 5 MG/ML
1 INJECTION INTRAMUSCULAR
Status: DISCONTINUED | OUTPATIENT
Start: 2022-06-29 | End: 2022-06-29 | Stop reason: HOSPADM

## 2022-06-29 RX ORDER — DEXAMETHASONE SODIUM PHOSPHATE 10 MG/ML
INJECTION INTRAMUSCULAR; INTRAVENOUS PRN
Status: DISCONTINUED | OUTPATIENT
Start: 2022-06-29 | End: 2022-06-29 | Stop reason: SDUPTHER

## 2022-06-29 RX ORDER — HYDROMORPHONE HYDROCHLORIDE 2 MG/ML
0.25 INJECTION, SOLUTION INTRAMUSCULAR; INTRAVENOUS; SUBCUTANEOUS EVERY 5 MIN PRN
Status: DISCONTINUED | OUTPATIENT
Start: 2022-06-29 | End: 2022-06-29 | Stop reason: HOSPADM

## 2022-06-29 RX ORDER — LIDOCAINE HYDROCHLORIDE 10 MG/ML
1 INJECTION, SOLUTION EPIDURAL; INFILTRATION; INTRACAUDAL; PERINEURAL
Status: DISCONTINUED | OUTPATIENT
Start: 2022-06-29 | End: 2022-06-29 | Stop reason: HOSPADM

## 2022-06-29 RX ORDER — PROCHLORPERAZINE EDISYLATE 5 MG/ML
5 INJECTION INTRAMUSCULAR; INTRAVENOUS
Status: DISCONTINUED | OUTPATIENT
Start: 2022-06-29 | End: 2022-06-29 | Stop reason: HOSPADM

## 2022-06-29 RX ORDER — HYDRALAZINE HYDROCHLORIDE 20 MG/ML
10 INJECTION INTRAMUSCULAR; INTRAVENOUS
Status: DISCONTINUED | OUTPATIENT
Start: 2022-06-29 | End: 2022-06-29 | Stop reason: HOSPADM

## 2022-06-29 RX ORDER — HYDROMORPHONE HYDROCHLORIDE 2 MG/ML
0.5 INJECTION, SOLUTION INTRAMUSCULAR; INTRAVENOUS; SUBCUTANEOUS EVERY 5 MIN PRN
Status: DISCONTINUED | OUTPATIENT
Start: 2022-06-29 | End: 2022-06-29 | Stop reason: HOSPADM

## 2022-06-29 RX ORDER — PROPOFOL 10 MG/ML
INJECTION, EMULSION INTRAVENOUS PRN
Status: DISCONTINUED | OUTPATIENT
Start: 2022-06-29 | End: 2022-06-29 | Stop reason: SDUPTHER

## 2022-06-29 RX ORDER — SODIUM CHLORIDE, SODIUM LACTATE, POTASSIUM CHLORIDE, CALCIUM CHLORIDE 600; 310; 30; 20 MG/100ML; MG/100ML; MG/100ML; MG/100ML
INJECTION, SOLUTION INTRAVENOUS CONTINUOUS
Status: DISCONTINUED | OUTPATIENT
Start: 2022-06-29 | End: 2022-06-29 | Stop reason: HOSPADM

## 2022-06-29 RX ADMIN — DEXAMETHASONE SODIUM PHOSPHATE 10 MG: 10 INJECTION INTRAMUSCULAR; INTRAVENOUS at 09:29

## 2022-06-29 RX ADMIN — OXYCODONE 5 MG: 5 TABLET ORAL at 11:27

## 2022-06-29 RX ADMIN — ONDANSETRON 4 MG: 2 INJECTION INTRAMUSCULAR; INTRAVENOUS at 09:29

## 2022-06-29 RX ADMIN — Medication 2000 MG: at 09:12

## 2022-06-29 RX ADMIN — OXYCODONE 5 MG: 5 TABLET ORAL at 10:59

## 2022-06-29 RX ADMIN — ROPIVACAINE HYDROCHLORIDE 30 ML: 5 INJECTION, SOLUTION EPIDURAL; INFILTRATION; PERINEURAL at 08:30

## 2022-06-29 RX ADMIN — KETOROLAC TROMETHAMINE 30 MG: 30 INJECTION, SOLUTION INTRAMUSCULAR; INTRAVENOUS at 10:07

## 2022-06-29 RX ADMIN — SODIUM CHLORIDE, POTASSIUM CHLORIDE, SODIUM LACTATE AND CALCIUM CHLORIDE: 600; 310; 30; 20 INJECTION, SOLUTION INTRAVENOUS at 07:03

## 2022-06-29 RX ADMIN — PROPOFOL 200 MG: 10 INJECTION, EMULSION INTRAVENOUS at 09:07

## 2022-06-29 RX ADMIN — FENTANYL CITRATE 50 MCG: 50 INJECTION INTRAMUSCULAR; INTRAVENOUS at 09:26

## 2022-06-29 RX ADMIN — FENTANYL CITRATE 50 MCG: 50 INJECTION INTRAMUSCULAR; INTRAVENOUS at 09:34

## 2022-06-29 RX ADMIN — GLYCOPYRROLATE 0.1 MG: 0.2 INJECTION INTRAMUSCULAR; INTRAVENOUS at 09:43

## 2022-06-29 RX ADMIN — GLYCOPYRROLATE 0.1 MG: 0.2 INJECTION INTRAMUSCULAR; INTRAVENOUS at 09:44

## 2022-06-29 RX ADMIN — MIDAZOLAM 2 MG: 1 INJECTION INTRAMUSCULAR; INTRAVENOUS at 08:30

## 2022-06-29 RX ADMIN — LIDOCAINE HYDROCHLORIDE 60 MG: 20 INJECTION, SOLUTION EPIDURAL; INFILTRATION; INTRACAUDAL; PERINEURAL at 09:06

## 2022-06-29 RX ADMIN — Medication 10 MG: at 09:43

## 2022-06-29 ASSESSMENT — PAIN DESCRIPTION - LOCATION
LOCATION: SHOULDER

## 2022-06-29 ASSESSMENT — PAIN DESCRIPTION - ORIENTATION
ORIENTATION: LEFT

## 2022-06-29 ASSESSMENT — PAIN SCALES - GENERAL
PAINLEVEL_OUTOF10: 6
PAINLEVEL_OUTOF10: 4

## 2022-06-29 NOTE — ANESTHESIA POSTPROCEDURE EVALUATION
Department of Anesthesiology  Postprocedure Note    Patient: Mario Garcia  MRN: [id number]  YOB: 1985  Date of evaluation: 6/29/2022      Procedure Summary     Date: 06/29/22 Room / Location: [facility] OP OR 06 / [facility]    Anesthesia Start: 1258 Anesthesia Stop: 4330    Procedure: LEFT SHOULDER ARTHROSCOPY LABRAL REPAIR/CAPSULORRHAPHY (Left Shoulder) Diagnosis:       Instability of left shoulder joint      Left shoulder pain, unspecified chronicity      (M25.512 LEFT SHOULDER PAIN, UNSPECIFIED CHRONICITY  M25.312 INSTABILITY OF LEFT SHOULDER JOINT)    Surgeons: Rina Mason MD Responsible Provider: Broderick Mcgill MD    Anesthesia Type: General ASA Status: 3          Anesthesia Type: General    Donavon Phase I: Donavon Score: 9    Donavon Phase II: Donavon Score: 10      Anesthesia Post Evaluation    Patient location during evaluation: PACU  Patient participation: complete - patient participated  Level of consciousness: awake and alert  Airway patency: patent  Nausea & Vomiting: no nausea and no vomiting  Complications: no  Cardiovascular status: hemodynamically stable  Respiratory status: acceptable  Hydration status: euvolemic  Multimodal analgesia pain management approach

## 2022-06-29 NOTE — ANESTHESIA PROCEDURE NOTES
Peripheral Block    Patient location during procedure: pre-op  Reason for block: post-op pain management and at surgeon's request  Start time: 6/29/2022 8:30 AM  End time: 6/29/2022 8:34 AM  Staffing  Performed: anesthesiologist   Anesthesiologist: Jesus Banda MD  Preanesthetic Checklist  Completed: patient identified, IV checked, site marked, risks and benefits discussed, surgical/procedural consents, equipment checked, pre-op evaluation, timeout performed, anesthesia consent given, oxygen available and monitors applied/VS acknowledged  Peripheral Block   Patient position: sitting  Prep: ChloraPrep  Provider prep: mask and sterile gloves  Patient monitoring: cardiac monitor, continuous pulse ox, oxygen, IV access, frequent blood pressure checks and responsive to questions  Block type: Brachial plexus  Interscalene  Laterality: left  Injection technique: single-shot  Guidance: ultrasound guided  Local infiltration: lidocaine  Infiltration strength: 1 %  Local infiltration: lidocaine  Dose: 3 mL    Needle   Needle type: insulated echogenic nerve stimulator needle   Needle gauge: 20 G  Needle localization: nerve stimulator, ultrasound guidance and anatomical landmarks  Needle length: 10 cm  Assessment   Injection assessment: negative aspiration for heme, no paresthesia on injection, local visualized surrounding nerve on ultrasound and no intravascular symptoms  Slow fractionated injection: yes  Hemodynamics: stable  Real-time US image taken/store: yes  Outcomes: patient tolerated procedure well and uncomplicated    Additional Notes    Needle inserted and placed in close proximity to the nerve under real time ultrasound guidance. Ultrasound was used to visualize the spread of local anesthetic in close proximity to the nerve being blocked. The nerve appeared anatomically normal and there were no abnormal findings. Ultrasound imaged printed and placed on chart.       Medications Administered  Ropivacaine (NAROPIN) injection 0.5%, 30 mL

## 2022-06-29 NOTE — ANESTHESIA PROCEDURE NOTES
Airway  Date/Time: 6/29/2022 9:18 AM  Urgency: elective    Airway not difficult    General Information and Staff    Patient location during procedure: OR  Anesthesiologist: Erica Reza MD  Resident/CRNA: JARAD Brown CRNA  Performed: resident/CRNA     Indications and Patient Condition  Indications for airway management: anesthesia  Spontaneous Ventilation: absent  Sedation level: deep  Preoxygenated: yes  Patient position: sniffing  MILS not maintained throughout  Mask difficulty assessment: vent by bag mask    Final Airway Details  Final airway type: supraglottic airway        Number of attempts at approach: 1  Ventilation between attempts: bag mask  Number of other approaches attempted: 0    Additional Comments  Atraumatic placement x 1 attempt.   no

## 2022-06-29 NOTE — OP NOTE
Operative Report    Patient: Garrett Rocha MRN: [id number]  SSN: [id number]    YOB: 1985  Age: 31 y.o. Sex: male       Date of Surgery: 6/29/22    Preoperative Diagnosis:   1)Left Shoulder anterior labral tear/bankart    Postoperative Diagnosis: Same as above     Surgeon(s) and Role:     * Brad Cota MD - Primary      Anesthesia: General + ISB block    Procedure: 1) Left Shoulder Arthroscopic Labral Repair/capsulorraphy      Estimated Blood Loss:  5 mL      Implants: Arthrex            Specimens: * No specimens in log *        Drains: None                Complications: None    Counts: Sponge and needle counts were correct times two. Procedure in Detail: After informed consent was obtained the surgical site was marked in the preoperative area by myself and the patient was brought to the operating room and general anesthesia was induced. The patient was positioned in the Lateral decubitus position with all bony prominences well-padded and the operative shoulder was prepped and draped in the usual orthopedic sterile fashion. Timeout was performed per protocol antibiotics given per protocol. Initially a standard posterior lateral arthroscopy viewing portal was established. Diagnostic arthroscopy was carried out. There was maintenance of the articular surface of the humeral head and glenoid. The subscapularis was intact. Rotator interval had mild inflammation. Anterior interval portal was established under spinal needle guidance. Biceps tendon was pulled into the joint and had minimal tenosynovitis. Superior labrum was probed and demonstrated no significant tearing. Anterior labrum demonstrated chronic Bankart lesion that began about 2-3 o'clock and extended around to between 5 and 6:00. This was scarred to the glenoid neck. .  Undersurface of the infraspinatus and supraspinatus demonstrated no evidence of tearing.   Posterior labrum had some superficial fraying that was debrided away using motorized shaver. We established an accessory anterior interval portal off leading edge of the supraspinatus. There was a shallow Hill-Sachs lesion that was not deep. Suture lasso was then used to pass a pinch type technique suture between the 5 and 6 o'clock position through the anterior-inferior capsule and then up through the labral tissues and a labral tape was shuttled across. This was placed into a knotless push lock anchor between the 5 and 6 o'clock position up on the face of the glenoid. We then repeated this with a pinch tuck technique through the anterior band of the IGH L and underneath the labrum between the 4 and 5 o'clock position. This was then placed into a knotless push lock anchor at about the 4 to 5 o'clock position. We then repeated this coming further up through the anterior band of the IGH L again and placed this into a push lock anchor between the 3 and 4 o'clock position. There was still a small residual anterior labral tear more proximal to that between 2 and 3:00 so we worked from the superior portal and passed a suture tape and placed this into a mini push lock anchor at about the 2 o'clock position tensioning this appropriately. I was pleased with the restoration of the labral tissue and the anterior bumper and the capsular tension. Shoulder was then drained portal sites were closed with buried Monocryl suture and Steri-Strips sterile dressing cryotherapy device and sling were applied. Beny tolerated the procedure well was awakened and transferred to the PACU in stable condition    Discharge plan:  Disposition: Home  Rehab protocol: Anterior labral repair/capsulorrhaphy protocol.           Signed By:  Monse Shafer MD     June 29, 2022

## 2022-06-30 ENCOUNTER — TELEPHONE (OUTPATIENT)
Dept: ORTHOPEDIC SURGERY | Age: 37
End: 2022-06-30

## 2022-06-30 ENCOUNTER — OFFICE VISIT (OUTPATIENT)
Dept: ORTHOPEDIC SURGERY | Age: 37
End: 2022-06-30
Payer: OTHER GOVERNMENT

## 2022-06-30 DIAGNOSIS — M25.312 INSTABILITY OF LEFT SHOULDER JOINT: ICD-10-CM

## 2022-06-30 DIAGNOSIS — M25.512 LEFT SHOULDER PAIN, UNSPECIFIED CHRONICITY: Primary | ICD-10-CM

## 2022-06-30 PROCEDURE — E0218 FLUID CIRC COLD PAD W PUMP: HCPCS | Performed by: ORTHOPAEDIC SURGERY

## 2022-06-30 NOTE — TELEPHONE ENCOUNTER
Left a VM for the patient and told him to call back if he would like an iceman. He can schedule a DME appointment for today to come by and pick one up if he is interested.

## 2022-06-30 NOTE — LETTER
DME Patient Authorization Form    Name: Artemio Schwab  : 1985  MRN: [id number]   Age: 31 y.o. Gender: male  Delivery Address: [facility]     Diagnosis:    1. Instability of left shoulder joint  M25.312 Shoulder ImmobIlizer ()       External Referral to Physical Therapy   2. Left shoulder pain, unspecified chronicity  M25.512 Shoulder ImmobIlizer ()       External Referral to Physical Therapy          Requested DME:  Ice Man  ($200) X 1 - left        Clinical Notes:     **Indicates non-covered items by insurance. Payment expected on date of service. Electronically signed by  Provider: _______________________________ Date: 2022                             [facility] Tax ID # [id number]        Durable Medical Equipment and/or Orthotics Patient Consent     I understand that my physician has prescribed this medical supply as part of my treatment plan as a matter of Medical Necessity.  I understand that I have a choice in where I receive my prescribed orthopedic supplies and/or services.  I authorize [facility] to furnish this service/product and to provide my insurance carrier with any information requested in order to process for payment.  I instruct my insurance carrier to pay [facility] directly for these services/products.  I understand that my insurance carrier may deny payment for this supply because it is a non-covered item, deemed not medically necessary or considered experimental.   I understand that any cost not covered by my insurance carrier will be solely my financial responsibility.  I have received the Supplier Standards and have reviewed them.    I have received the prescribed item and have been fully instructed on the proper use of the above services/products.    ______ (Patient Initials) I understand that all DME items are non-returnable after being dispensed. Items still in sealed packaging may be returned up to 14 days after purchasing. [address] will replace items that are defective.    ______ (Patient Initials) I understand that [facility] will not file a claim with my insurance carrier for this service/product and I am waiving my right to file a claim on my own for this service/product with my insurance company as this item is NON-COVERED (Denoted by the **) by my Insurance company/policy. ______ (Patient Initials) I understand that I am responsible to bring my equipment to the hospital for any surgery. ______________________________________________  ________________________  Patient / Jaylan Old            Thank you for considering [address] Your physician has prescribed specific medical equipment or devices for your home use. The following describes your rights and responsibilities as our customer. Right to Choose Providers: You have a choice regarding which company supplies your home medical equipment and devices, and to consult your physician in this decision. You may choose a medical supply store, a home medical equipment provider, or a specialist such as [facility]. [facility] will coordinate with your physician to provide the medical equipment or devices prescribed for your home use. Right to Service:  You have the right to considerate, respectful and nondiscriminatory care. You have the right to receive accurate and easily understood information about your health care. If you speak a foreign language, or don't understand the discussions, assistance will be provided to allow you to make informed health care decisions.   You have the right to know your treatment options and to participate in decisions about your care, including the right to accept or refuse treatment. You have the right to expect a reasonable response to your requests for treatment or service. You have the right to talk in confidence with health care providers and to have your health care information protected. You have the right to receive an explanation of your bill. You have the right to complain about the service or product you receive. Patient Responsibilities:  Please provide complete and accurate information about your health insurance benefits and make arrangements for the timely payment of your bill. [facility] will, if possible, assume responsibility for billing your insurance (Medicare, Medicaid and commercial) for the prescribed equipment or devices. If your policy does not cover the prescribed product, or only covers a portion of the bill, you are responsible for any remaining balance. Return and Exchange Policy:  [facility] will honor published  Warranties for products. [facility] will accept returns or exchanges within 14 days from the date of receipt, providin) the product must be in new condition; 2) receipt as required; and 3) used disposable and hygiene products may only be returned due to a defective product. Note: Refunds will be issued in a timely manner, please allow 4-6 weeks for processing. Complaint Procedures and DME Consumer Protection Resources:  [facility] values you as a customer, and is committed to resolving patient concerns. This commitment includes understanding and documenting your concerns, conducting a review of internal procedures, and providing you with an explanation and resolution to your concerns. Should you have any questions about our services or billing process, please contact our office at (practice phone number).   If we are unable to resolve the concern, you have the right to direct comments to the office of Consumer Protection, in the [address] or the [facility]  office, without fear of repercussion. DMEPOS SUPPLIER STANDARDS    A supplier must be in compliance with all applicable Federal and [facility] and regulatory requirements. A supplier must provide complete and accurate information on the DMEPOS supplier application. Any changes to this information must be reported to the [facility] within 30 days. An authorized individual (one whose signature is binding) must sign the application for billing privileges. A supplier must fill orders from its own inventory, or must contract with other companies for the purchase of items necessary to fill the order. A supplier may not contract with any entity that is currently excluded from the Medicare program, any [facility] program, or from any other Federal procurement or Nonprocurement programs. A supplier must advise beneficiaries that they may rent or purchase inexpensive or routinely purchased durable medical equipment, and of the purchase option for capped rental equipment. A supplier must notify beneficiaries of warranty coverage and honor all warranties under applicable State Law, and repair or replace free of charge Medicare covered items that are under warranty. A supplier must maintain a physical facility on an appropriate site. A supplier must permit CMS, or its agents to conduct on-site inspections to ascertain the supplier's compliance with these standards. The supplier location must be accessible to beneficiaries during reasonable business hours, and must maintain a visible sign and posted hours of operation. A supplier must maintain a primary business telephone listed under the name of the business in a [employer] or a toll free number available through directory assistance. The exclusive use of a beeper, answering machine or cell phone is prohibited.   A supplier must have comprehensive liability insurance in the amount of at least $300,000 that covers both the supplier's place of business and all customers and employees of the supplier. If the supplier manufactures its own items, this insurance must also cover product liability and completed operations. A supplier must agree not to initiate telephone contact with beneficiaries, with a few exceptions allowed. This standard prohibits suppliers from calling beneficiaries in order to solicit new business. A supplier is responsible for delivery and must instruct beneficiaries on use of Medicare covered items, and maintain proof of delivery. A supplier must answer questions, and respond to complaints of the beneficiaries, and maintain documentation of such contacts. A supplier must maintain and replace at no charge or repair directly, or through a service contract with another company, Medicare covered items it has rented to beneficiaries. A supplier must accept returns of substandard (less than full quality for the particular item) or unsuitable items (inappropriate for the beneficiary at the time it was fitted and rented or sold) from beneficiaries. A supplier must disclose these supplier standards to each beneficiary to whom it supplies a Medicare-covered item. A supplier must disclose to the government any person having ownership, financial, or control interest in the supplier. A supplier must not convey or reassign a supplier number; i.e., the supplier may not sell or allow another entity to use its Medicare billing number. A supplier must have a complaint resolution protocol established to address beneficiary complaints that relate to these standards. A record of these complaints must be maintained at the physical facility. Complaint records must include: the name, address, telephone number and health insurance claim number of the beneficiary, a summary of the complaint, and any action taken to resolve it. A supplier must agree to furnish CMS any information required by the Medicare statute and implementing regulations.   A supplier of

## 2022-07-01 ENCOUNTER — TELEPHONE (OUTPATIENT)
Dept: ORTHOPEDIC SURGERY | Age: 37
End: 2022-07-01

## 2022-07-01 DIAGNOSIS — M25.312 INSTABILITY OF LEFT SHOULDER JOINT: Primary | ICD-10-CM

## 2022-07-01 RX ORDER — ONDANSETRON 4 MG/1
4 TABLET, FILM COATED ORAL
Qty: 20 TABLET | Refills: 0 | Status: SHIPPED | OUTPATIENT
Start: 2022-07-01

## 2022-07-01 RX ORDER — GABAPENTIN 300 MG/1
300 CAPSULE ORAL 3 TIMES DAILY
Qty: 21 CAPSULE | Refills: 0 | Status: SHIPPED | OUTPATIENT
Start: 2022-07-01 | End: 2022-07-06

## 2022-07-01 RX ORDER — HYDROCODONE BITARTRATE AND ACETAMINOPHEN 7.5; 325 MG/1; MG/1
1-2 TABLET ORAL EVERY 6 HOURS PRN
Qty: 25 TABLET | Refills: 0 | Status: SHIPPED | OUTPATIENT
Start: 2022-07-01 | End: 2022-07-08

## 2022-07-01 NOTE — TELEPHONE ENCOUNTER
I spoke with the patient's significant other and she said Honey Laws is in a lot of pain and that the ice and oxycodone isn't helping. I told her that sometimes patients don't respond well to the oxycodone and that I would discuss this with Dr. Connors and give her a call back.

## 2022-07-01 NOTE — PROGRESS NOTES
Patient claims intolerance to the oxycodone with poor pain control. I have sent a prescription for Norco.  He does have a reported allergy to acetaminophen which gives him nausea so I prescribed Zofran. He is unable to take NSAIDs due to his Eliquis. So I prescribed gabapentin for multimodal pain control.

## 2022-07-01 NOTE — TELEPHONE ENCOUNTER
He had surgery and is still in a lot of pain. The pain meds aren't helping and they are also icing it. Please call to discuss.

## 2022-07-06 RX ORDER — GABAPENTIN 300 MG/1
CAPSULE ORAL
Qty: 21 CAPSULE | Refills: 0 | Status: SHIPPED | OUTPATIENT
Start: 2022-07-06 | End: 2022-07-20

## 2022-07-12 ENCOUNTER — OFFICE VISIT (OUTPATIENT)
Dept: ORTHOPEDIC SURGERY | Age: 37
End: 2022-07-12

## 2022-07-12 DIAGNOSIS — Z09 S/P ORTHOPEDIC SURGERY, FOLLOW-UP EXAM: ICD-10-CM

## 2022-07-12 DIAGNOSIS — M25.312 INSTABILITY OF LEFT SHOULDER JOINT: Primary | ICD-10-CM

## 2022-07-12 PROCEDURE — 99024 POSTOP FOLLOW-UP VISIT: CPT | Performed by: ORTHOPAEDIC SURGERY

## 2022-07-12 RX ORDER — HYDROCODONE BITARTRATE AND ACETAMINOPHEN 7.5; 325 MG/1; MG/1
1 TABLET ORAL EVERY 6 HOURS PRN
Qty: 15 TABLET | Refills: 0 | Status: SHIPPED | OUTPATIENT
Start: 2022-07-12 | End: 2022-07-17

## 2022-07-12 RX ORDER — GABAPENTIN 300 MG/1
CAPSULE ORAL
Qty: 21 CAPSULE | Refills: 0 | OUTPATIENT
Start: 2022-07-12 | End: 2022-07-26

## 2022-07-12 NOTE — PROGRESS NOTES
Name: Yuli Adams  YOB: 1985  Gender: male  MRN: [id number]    Procedure Performed: 1)Left Shoulder anterior labral tear/bankart      Date of Procedure: 06/29/2022    Subjective: He is 13 days s/p of the above procedure. He is well and reports that his pain has been tolerable. He has been compliant with physical therapy and is gradually progressing. Physical Examination:Incisions clean dry and intact, no sign of infection. Motor and sensory intact. Tolerates gentle rotation. Passive elevation up to about 70 to 80 degrees      Assessment:   1. Instability of left shoulder joint    2. S/P orthopedic surgery, follow-up exam         Plan:   Doing well. .  His pain is improving but I did refill his Norco with a few more pills today. Continue PT per anterior labral repair/capsulorrhaphy protocol.   Follow up in 4 weeks

## 2022-08-16 ENCOUNTER — OFFICE VISIT (OUTPATIENT)
Dept: ORTHOPEDIC SURGERY | Age: 37
End: 2022-08-16

## 2022-08-16 DIAGNOSIS — Z09 S/P ORTHOPEDIC SURGERY, FOLLOW-UP EXAM: Primary | ICD-10-CM

## 2022-08-16 DIAGNOSIS — M25.312 INSTABILITY OF LEFT SHOULDER JOINT: ICD-10-CM

## 2022-08-16 PROCEDURE — 99024 POSTOP FOLLOW-UP VISIT: CPT | Performed by: ORTHOPAEDIC SURGERY

## 2022-08-16 NOTE — PROGRESS NOTES
Name: Keiko Zarate  YOB: 1985  Gender: male  MRN: [id number]      CC: Shoulder Pain (L shoulder recheck)       HPI: Keiko Zarate is a 31 y.o. male who returns for follow up on his left shoulder. He is 7 weeks s/p Left Shoulder Arthroscopic Labral Repair/capsulorraphy and reports some popping in shoulder that began at physical therapy one day. He notes an increase in pain associated with the popping but denies any new injuries to the shoulder. He says the popping and pain come and go but have been worse recently. Physical Examination:  General: no acute distress  Lungs: breathing easily  CV: regular rhythm by pulse  Left Shoulder: No tenderness to palpation. Active forward flexion to 130 degrees, passive to 150 degrees. Active external rotation with elbows at side to neutral, internal rotation to hip. Assessment:     ICD-10-CM    1. S/P orthopedic surgery, follow-up exam  Z09       2. Instability of left shoulder joint  M25.312           Plan:   Patient is progressing well status post the above procedure. I encouraged him that I think that some of the popping may be some scar tissue and I am not overly concerned with at this time. I think he can continue formal physical therapy. Teresa Schmidt NP dictating as a scribe for MD Domonique Sotomayor.  Adrian Tatum MD, [address] [place] [address] and Sports Medicine

## 2022-10-27 ENCOUNTER — OFFICE VISIT (OUTPATIENT)
Dept: ORTHOPEDIC SURGERY | Age: 37
End: 2022-10-27
Payer: OTHER GOVERNMENT

## 2022-10-27 DIAGNOSIS — M25.312 INSTABILITY OF LEFT SHOULDER JOINT: Primary | ICD-10-CM

## 2022-10-27 PROCEDURE — 99213 OFFICE O/P EST LOW 20 MIN: CPT | Performed by: ORTHOPAEDIC SURGERY

## 2022-10-27 NOTE — PROGRESS NOTES
Name: Ynag Hurd  YOB: 1985  Gender: male  MRN: [id number]      CC: Follow-up (L shoulder recheck)       HPI: Yang Hurd is a 30 y.o. male who returns for follow up on left shoulder pain. He is 4 months s/p of a labral repair/capsulorrhaphy. He continues to be progressing well and has just completed his last session of formal physical therapy. He does continue to have a popping sensation with certain movements but doesn't have much discomfort from it. Physical Examination:  General: no acute distress  Lungs: breathing easily  CV: regular rhythm by pulse  Left Shoulder: Symmetric active and passive range of motion of the contralateral side without pain. No apprehension negative load-and-shift 5-5 rotator cuff strength. Assessment:     ICD-10-CM    1. Instability of left shoulder joint  M25.312           Plan:   Excellent functional recovery. He can advance activity slowly as tolerated follow-up with me as needed             Truman Mendez MD, [address] [place] [address] and Sports Medicine

## 2022-12-02 ENCOUNTER — APPOINTMENT (OUTPATIENT)
Dept: CT IMAGING | Age: 37
DRG: 440 | End: 2022-12-02
Payer: OTHER GOVERNMENT

## 2022-12-02 ENCOUNTER — HOSPITAL ENCOUNTER (INPATIENT)
Age: 37
LOS: 3 days | Discharge: HOME | DRG: 440 | End: 2022-12-05
Attending: EMERGENCY MEDICINE | Admitting: FAMILY MEDICINE
Payer: OTHER GOVERNMENT

## 2022-12-02 DIAGNOSIS — K85.90 ACUTE PANCREATITIS, UNSPECIFIED COMPLICATION STATUS, UNSPECIFIED PANCREATITIS TYPE: Primary | ICD-10-CM

## 2022-12-02 LAB
ALBUMIN SERPL-MCNC: 4.2 G/DL (ref 3.5–5)
ALBUMIN/GLOB SERPL: 1.2 {RATIO} (ref 0.4–1.6)
ALP SERPL-CCNC: 83 U/L (ref 50–136)
ALT SERPL-CCNC: 55 U/L (ref 12–65)
ANION GAP SERPL CALC-SCNC: 7 MMOL/L (ref 2–11)
AST SERPL-CCNC: 38 U/L (ref 15–37)
BASOPHILS # BLD: 0 K/UL (ref 0–0.2)
BASOPHILS NFR BLD: 0 % (ref 0–2)
BILIRUB SERPL-MCNC: 0.7 MG/DL (ref 0.2–1.1)
BILIRUB UR QL: ABNORMAL
BUN SERPL-MCNC: 11 MG/DL (ref 6–23)
CALCIUM SERPL-MCNC: 9 MG/DL (ref 8.3–10.4)
CHLORIDE SERPL-SCNC: 100 MMOL/L (ref 101–110)
CO2 SERPL-SCNC: 27 MMOL/L (ref 21–32)
CREAT SERPL-MCNC: 1 MG/DL (ref 0.8–1.5)
DIFFERENTIAL METHOD BLD: ABNORMAL
EOSINOPHIL # BLD: 0 K/UL (ref 0–0.8)
EOSINOPHIL NFR BLD: 1 % (ref 0.5–7.8)
ERYTHROCYTE [DISTWIDTH] IN BLOOD BY AUTOMATED COUNT: 11.6 % (ref 11.9–14.6)
GLOBULIN SER CALC-MCNC: 3.6 G/DL (ref 2.8–4.5)
GLUCOSE SERPL-MCNC: 103 MG/DL (ref 65–100)
GLUCOSE UR QL STRIP.AUTO: NEGATIVE MG/DL
HCT VFR BLD AUTO: 45.6 % (ref 41.1–50.3)
HGB BLD-MCNC: 15.9 G/DL (ref 13.6–17.2)
IMM GRANULOCYTES # BLD AUTO: 0 K/UL (ref 0–0.5)
IMM GRANULOCYTES NFR BLD AUTO: 0 % (ref 0–5)
KETONES UR-MCNC: 15 MG/DL
LEUKOCYTE ESTERASE UR QL STRIP: NEGATIVE
LIPASE SERPL-CCNC: 989 U/L (ref 73–393)
LYMPHOCYTES # BLD: 1.4 K/UL (ref 0.5–4.6)
LYMPHOCYTES NFR BLD: 16 % (ref 13–44)
MCH RBC QN AUTO: 33.6 PG (ref 26.1–32.9)
MCHC RBC AUTO-ENTMCNC: 34.9 G/DL (ref 31.4–35)
MCV RBC AUTO: 96.4 FL (ref 82–102)
MONOCYTES # BLD: 0.6 K/UL (ref 0.1–1.3)
MONOCYTES NFR BLD: 7 % (ref 4–12)
NEUTS SEG # BLD: 6.6 K/UL (ref 1.7–8.2)
NEUTS SEG NFR BLD: 76 % (ref 43–78)
NITRITE UR QL: NEGATIVE
NRBC # BLD: 0 K/UL (ref 0–0.2)
PH UR: 6.5 [PH] (ref 5–9)
PLATELET # BLD AUTO: 250 K/UL (ref 150–450)
PMV BLD AUTO: 9.6 FL (ref 9.4–12.3)
POTASSIUM SERPL-SCNC: 3.3 MMOL/L (ref 3.5–5.1)
PROT SERPL-MCNC: 7.8 G/DL (ref 6.3–8.2)
PROT UR QL: 30 MG/DL
RBC # BLD AUTO: 4.73 M/UL (ref 4.23–5.6)
RBC # UR STRIP: ABNORMAL /UL
SERVICE CMNT-IMP: ABNORMAL
SODIUM SERPL-SCNC: 134 MMOL/L (ref 133–143)
SP GR UR: >1.03 (ref 1–1.02)
UROBILINOGEN UR QL: 0.2 EU/DL (ref 0.2–1)
WBC # BLD AUTO: 8.7 K/UL (ref 4.3–11.1)

## 2022-12-02 PROCEDURE — 99285 EMERGENCY DEPT VISIT HI MDM: CPT

## 2022-12-02 PROCEDURE — 80053 COMPREHEN METABOLIC PANEL: CPT

## 2022-12-02 PROCEDURE — 85025 COMPLETE CBC W/AUTO DIFF WBC: CPT

## 2022-12-02 PROCEDURE — 96374 THER/PROPH/DIAG INJ IV PUSH: CPT

## 2022-12-02 PROCEDURE — 83605 ASSAY OF LACTIC ACID: CPT

## 2022-12-02 PROCEDURE — 1100000000 HC RM PRIVATE

## 2022-12-02 PROCEDURE — 93005 ELECTROCARDIOGRAM TRACING: CPT

## 2022-12-02 PROCEDURE — 86140 C-REACTIVE PROTEIN: CPT

## 2022-12-02 PROCEDURE — 74176 CT ABD & PELVIS W/O CONTRAST: CPT

## 2022-12-02 PROCEDURE — 96361 HYDRATE IV INFUSION ADD-ON: CPT

## 2022-12-02 PROCEDURE — 81003 URINALYSIS AUTO W/O SCOPE: CPT

## 2022-12-02 PROCEDURE — 83690 ASSAY OF LIPASE: CPT

## 2022-12-02 PROCEDURE — 2580000003 HC RX 258

## 2022-12-02 PROCEDURE — 6360000002 HC RX W HCPCS

## 2022-12-02 PROCEDURE — 83615 LACTATE (LD) (LDH) ENZYME: CPT

## 2022-12-02 PROCEDURE — 96375 TX/PRO/DX INJ NEW DRUG ADDON: CPT

## 2022-12-02 RX ORDER — HYDROMORPHONE HYDROCHLORIDE 1 MG/ML
1 INJECTION, SOLUTION INTRAMUSCULAR; INTRAVENOUS; SUBCUTANEOUS ONCE
Status: COMPLETED | OUTPATIENT
Start: 2022-12-02 | End: 2022-12-02

## 2022-12-02 RX ORDER — METHYLPREDNISOLONE SODIUM SUCCINATE 125 MG/2ML
125 INJECTION, POWDER, LYOPHILIZED, FOR SOLUTION INTRAMUSCULAR; INTRAVENOUS
Status: COMPLETED | OUTPATIENT
Start: 2022-12-02 | End: 2022-12-02

## 2022-12-02 RX ORDER — MORPHINE SULFATE 4 MG/ML
4 INJECTION, SOLUTION INTRAMUSCULAR; INTRAVENOUS
Status: COMPLETED | OUTPATIENT
Start: 2022-12-02 | End: 2022-12-02

## 2022-12-02 RX ORDER — 0.9 % SODIUM CHLORIDE 0.9 %
1000 INTRAVENOUS SOLUTION INTRAVENOUS ONCE
Status: COMPLETED | OUTPATIENT
Start: 2022-12-02 | End: 2022-12-03

## 2022-12-02 RX ORDER — 0.9 % SODIUM CHLORIDE 0.9 %
1000 INTRAVENOUS SOLUTION INTRAVENOUS ONCE
Status: COMPLETED | OUTPATIENT
Start: 2022-12-02 | End: 2022-12-02

## 2022-12-02 RX ORDER — ONDANSETRON 2 MG/ML
4 INJECTION INTRAMUSCULAR; INTRAVENOUS ONCE
Status: COMPLETED | OUTPATIENT
Start: 2022-12-02 | End: 2022-12-02

## 2022-12-02 RX ORDER — DIPHENHYDRAMINE HYDROCHLORIDE 50 MG/ML
25 INJECTION INTRAMUSCULAR; INTRAVENOUS
Status: COMPLETED | OUTPATIENT
Start: 2022-12-02 | End: 2022-12-03

## 2022-12-02 RX ORDER — METOCLOPRAMIDE HYDROCHLORIDE 5 MG/ML
10 INJECTION INTRAMUSCULAR; INTRAVENOUS ONCE
Status: COMPLETED | OUTPATIENT
Start: 2022-12-02 | End: 2022-12-02

## 2022-12-02 RX ADMIN — METOCLOPRAMIDE 10 MG: 5 INJECTION, SOLUTION INTRAMUSCULAR; INTRAVENOUS at 23:40

## 2022-12-02 RX ADMIN — METHYLPREDNISOLONE SODIUM SUCCINATE 125 MG: 125 INJECTION, POWDER, FOR SOLUTION INTRAMUSCULAR; INTRAVENOUS at 21:12

## 2022-12-02 RX ADMIN — DIPHENHYDRAMINE HYDROCHLORIDE 25 MG: 50 INJECTION, SOLUTION INTRAMUSCULAR; INTRAVENOUS at 21:12

## 2022-12-02 RX ADMIN — SODIUM CHLORIDE 1000 ML: 9 INJECTION, SOLUTION INTRAVENOUS at 21:17

## 2022-12-02 RX ADMIN — HYDROMORPHONE HYDROCHLORIDE 1 MG: 1 INJECTION, SOLUTION INTRAMUSCULAR; INTRAVENOUS; SUBCUTANEOUS at 23:40

## 2022-12-02 RX ADMIN — ONDANSETRON 4 MG: 2 INJECTION INTRAMUSCULAR; INTRAVENOUS at 21:12

## 2022-12-02 RX ADMIN — MORPHINE SULFATE 4 MG: 4 INJECTION INTRAVENOUS at 21:28

## 2022-12-02 RX ADMIN — SODIUM CHLORIDE 1000 ML: 9 INJECTION, SOLUTION INTRAVENOUS at 23:40

## 2022-12-02 ASSESSMENT — ENCOUNTER SYMPTOMS
DIARRHEA: 0
FLATUS: 0
BACK PAIN: 1
COLOR CHANGE: 0
CONSTIPATION: 0
COUGH: 0
HEMATEMESIS: 0
ABDOMINAL PAIN: 1
SINUS PAIN: 0
SHORTNESS OF BREATH: 0
CHEST TIGHTNESS: 0
NAUSEA: 1
WHEEZING: 0
VOMITING: 1
BELCHING: 0
SORE THROAT: 0
RHINORRHEA: 0

## 2022-12-02 ASSESSMENT — PAIN DESCRIPTION - ORIENTATION: ORIENTATION: MID;UPPER

## 2022-12-02 ASSESSMENT — PAIN SCALES - GENERAL: PAINLEVEL_OUTOF10: 7

## 2022-12-02 ASSESSMENT — PAIN DESCRIPTION - LOCATION: LOCATION: ABDOMEN

## 2022-12-03 PROBLEM — I82.419 DVT FEMORAL (DEEP VENOUS THROMBOSIS) (HCC): Status: ACTIVE | Noted: 2022-03-30

## 2022-12-03 PROBLEM — F10.10 ALCOHOL ABUSE: Status: ACTIVE | Noted: 2022-12-03

## 2022-12-03 PROBLEM — F39 MOOD DISORDER (HCC): Status: ACTIVE | Noted: 2022-12-03

## 2022-12-03 PROBLEM — G89.4 CHRONIC PAIN DISORDER: Status: ACTIVE | Noted: 2022-12-03

## 2022-12-03 LAB
ALBUMIN SERPL-MCNC: 3.5 G/DL (ref 3.5–5)
ALBUMIN/GLOB SERPL: 1.1 {RATIO} (ref 0.4–1.6)
ALP SERPL-CCNC: 69 U/L (ref 50–136)
ALT SERPL-CCNC: 42 U/L (ref 12–65)
ANION GAP SERPL CALC-SCNC: 5 MMOL/L (ref 2–11)
AST SERPL-CCNC: 29 U/L (ref 15–37)
BILIRUB SERPL-MCNC: 0.7 MG/DL (ref 0.2–1.1)
BUN SERPL-MCNC: 9 MG/DL (ref 6–23)
CALCIUM SERPL-MCNC: 7.9 MG/DL (ref 8.3–10.4)
CHLORIDE SERPL-SCNC: 104 MMOL/L (ref 101–110)
CHOLEST SERPL-MCNC: 204 MG/DL
CO2 SERPL-SCNC: 26 MMOL/L (ref 21–32)
CREAT SERPL-MCNC: 0.9 MG/DL (ref 0.8–1.5)
CRP SERPL-MCNC: <0.3 MG/DL (ref 0–0.9)
EKG ATRIAL RATE: 73 BPM
EKG DIAGNOSIS: NORMAL
EKG P AXIS: 22 DEGREES
EKG P-R INTERVAL: 132 MS
EKG Q-T INTERVAL: 390 MS
EKG QRS DURATION: 90 MS
EKG QTC CALCULATION (BAZETT): 429 MS
EKG R AXIS: 64 DEGREES
EKG T AXIS: 44 DEGREES
EKG VENTRICULAR RATE: 73 BPM
GLOBULIN SER CALC-MCNC: 3.1 G/DL (ref 2.8–4.5)
GLUCOSE BLD STRIP.AUTO-MCNC: 149 MG/DL (ref 65–100)
GLUCOSE SERPL-MCNC: 158 MG/DL (ref 65–100)
HDLC SERPL-MCNC: 87 MG/DL (ref 40–60)
HDLC SERPL: 2.3 {RATIO}
LACTATE SERPL-SCNC: 0.4 MMOL/L (ref 0.4–2)
LDH SERPL L TO P-CCNC: 264 U/L (ref 100–190)
LDLC SERPL CALC-MCNC: 89.4 MG/DL
POTASSIUM SERPL-SCNC: 4.1 MMOL/L (ref 3.5–5.1)
PROT SERPL-MCNC: 6.6 G/DL (ref 6.3–8.2)
SERVICE CMNT-IMP: ABNORMAL
SODIUM SERPL-SCNC: 135 MMOL/L (ref 133–143)
TRIGL SERPL-MCNC: 138 MG/DL (ref 35–150)
VLDLC SERPL CALC-MCNC: 27.6 MG/DL (ref 6–23)

## 2022-12-03 PROCEDURE — 80053 COMPREHEN METABOLIC PANEL: CPT

## 2022-12-03 PROCEDURE — 36415 COLL VENOUS BLD VENIPUNCTURE: CPT

## 2022-12-03 PROCEDURE — 1100000000 HC RM PRIVATE

## 2022-12-03 PROCEDURE — 82962 GLUCOSE BLOOD TEST: CPT

## 2022-12-03 PROCEDURE — 6370000000 HC RX 637 (ALT 250 FOR IP): Performed by: FAMILY MEDICINE

## 2022-12-03 PROCEDURE — 6370000000 HC RX 637 (ALT 250 FOR IP)

## 2022-12-03 PROCEDURE — 6370000000 HC RX 637 (ALT 250 FOR IP): Performed by: INTERNAL MEDICINE

## 2022-12-03 PROCEDURE — 6360000002 HC RX W HCPCS: Performed by: INTERNAL MEDICINE

## 2022-12-03 PROCEDURE — 6360000002 HC RX W HCPCS

## 2022-12-03 PROCEDURE — 2580000003 HC RX 258: Performed by: FAMILY MEDICINE

## 2022-12-03 PROCEDURE — 80061 LIPID PANEL: CPT

## 2022-12-03 PROCEDURE — 82787 IGG 1 2 3 OR 4 EACH: CPT

## 2022-12-03 PROCEDURE — 2580000003 HC RX 258: Performed by: INTERNAL MEDICINE

## 2022-12-03 RX ORDER — PANTOPRAZOLE SODIUM 20 MG/1
20 TABLET, DELAYED RELEASE ORAL EVERY 12 HOURS SCHEDULED
COMMUNITY

## 2022-12-03 RX ORDER — LORAZEPAM 0.5 MG/1
0.5 TABLET ORAL EVERY 6 HOURS PRN
Status: DISCONTINUED | OUTPATIENT
Start: 2022-12-03 | End: 2022-12-05 | Stop reason: HOSPADM

## 2022-12-03 RX ORDER — HYDROCODONE BITARTRATE AND ACETAMINOPHEN 5; 325 MG/1; MG/1
1 TABLET ORAL EVERY 6 HOURS PRN
Status: DISCONTINUED | OUTPATIENT
Start: 2022-12-03 | End: 2022-12-05

## 2022-12-03 RX ORDER — DIVALPROEX SODIUM 500 MG/1
500 TABLET, EXTENDED RELEASE ORAL DAILY
Status: DISCONTINUED | OUTPATIENT
Start: 2022-12-03 | End: 2022-12-05 | Stop reason: HOSPADM

## 2022-12-03 RX ORDER — ONDANSETRON 2 MG/ML
4 INJECTION INTRAMUSCULAR; INTRAVENOUS EVERY 6 HOURS PRN
Status: DISCONTINUED | OUTPATIENT
Start: 2022-12-03 | End: 2022-12-05 | Stop reason: HOSPADM

## 2022-12-03 RX ORDER — HYDROCODONE BITARTRATE AND ACETAMINOPHEN 5; 325 MG/1; MG/1
TABLET ORAL
Status: COMPLETED
Start: 2022-12-03 | End: 2022-12-03

## 2022-12-03 RX ORDER — POLYETHYLENE GLYCOL 3350 17 G/17G
17 POWDER, FOR SOLUTION ORAL DAILY PRN
Status: DISCONTINUED | OUTPATIENT
Start: 2022-12-03 | End: 2022-12-05 | Stop reason: HOSPADM

## 2022-12-03 RX ORDER — DIPHENHYDRAMINE HYDROCHLORIDE 50 MG/ML
INJECTION INTRAMUSCULAR; INTRAVENOUS
Status: COMPLETED
Start: 2022-12-03 | End: 2022-12-03

## 2022-12-03 RX ORDER — NICOTINE 21 MG/24HR
1 PATCH, TRANSDERMAL 24 HOURS TRANSDERMAL DAILY
Status: DISCONTINUED | OUTPATIENT
Start: 2022-12-03 | End: 2022-12-05 | Stop reason: HOSPADM

## 2022-12-03 RX ORDER — CITALOPRAM 20 MG/1
40 TABLET ORAL DAILY
Status: DISCONTINUED | OUTPATIENT
Start: 2022-12-03 | End: 2022-12-05 | Stop reason: HOSPADM

## 2022-12-03 RX ORDER — MORPHINE SULFATE 2 MG/ML
2 INJECTION, SOLUTION INTRAMUSCULAR; INTRAVENOUS EVERY 4 HOURS PRN
Status: DISCONTINUED | OUTPATIENT
Start: 2022-12-03 | End: 2022-12-03

## 2022-12-03 RX ORDER — MORPHINE SULFATE 2 MG/ML
4 INJECTION, SOLUTION INTRAMUSCULAR; INTRAVENOUS EVERY 4 HOURS PRN
Status: DISCONTINUED | OUTPATIENT
Start: 2022-12-03 | End: 2022-12-05

## 2022-12-03 RX ORDER — ONDANSETRON 4 MG/1
4 TABLET, ORALLY DISINTEGRATING ORAL EVERY 8 HOURS PRN
Status: DISCONTINUED | OUTPATIENT
Start: 2022-12-03 | End: 2022-12-05 | Stop reason: HOSPADM

## 2022-12-03 RX ORDER — PHENOL 1.4 %
AEROSOL, SPRAY (ML) MUCOUS MEMBRANE
COMMUNITY

## 2022-12-03 RX ORDER — LANOLIN ALCOHOL/MO/W.PET/CERES
100 CREAM (GRAM) TOPICAL DAILY
Status: DISCONTINUED | OUTPATIENT
Start: 2022-12-03 | End: 2022-12-05 | Stop reason: HOSPADM

## 2022-12-03 RX ORDER — SODIUM CHLORIDE 0.9 % (FLUSH) 0.9 %
5-40 SYRINGE (ML) INJECTION EVERY 12 HOURS SCHEDULED
Status: DISCONTINUED | OUTPATIENT
Start: 2022-12-03 | End: 2022-12-05 | Stop reason: HOSPADM

## 2022-12-03 RX ORDER — ACETAMINOPHEN 650 MG/1
650 SUPPOSITORY RECTAL EVERY 6 HOURS PRN
Status: DISCONTINUED | OUTPATIENT
Start: 2022-12-03 | End: 2022-12-05

## 2022-12-03 RX ORDER — SODIUM CHLORIDE 0.9 % (FLUSH) 0.9 %
5-40 SYRINGE (ML) INJECTION PRN
Status: DISCONTINUED | OUTPATIENT
Start: 2022-12-03 | End: 2022-12-05 | Stop reason: HOSPADM

## 2022-12-03 RX ORDER — SODIUM CHLORIDE 9 MG/ML
INJECTION, SOLUTION INTRAVENOUS PRN
Status: DISCONTINUED | OUTPATIENT
Start: 2022-12-03 | End: 2022-12-05 | Stop reason: HOSPADM

## 2022-12-03 RX ORDER — SODIUM CHLORIDE, SODIUM LACTATE, POTASSIUM CHLORIDE, CALCIUM CHLORIDE 600; 310; 30; 20 MG/100ML; MG/100ML; MG/100ML; MG/100ML
INJECTION, SOLUTION INTRAVENOUS CONTINUOUS
Status: DISCONTINUED | OUTPATIENT
Start: 2022-12-03 | End: 2022-12-05 | Stop reason: HOSPADM

## 2022-12-03 RX ORDER — ACETAMINOPHEN 325 MG/1
650 TABLET ORAL EVERY 6 HOURS PRN
Status: DISCONTINUED | OUTPATIENT
Start: 2022-12-03 | End: 2022-12-05

## 2022-12-03 RX ADMIN — MORPHINE SULFATE 4 MG: 2 INJECTION, SOLUTION INTRAMUSCULAR; INTRAVENOUS at 23:58

## 2022-12-03 RX ADMIN — HYDROCODONE BITARTRATE AND ACETAMINOPHEN 1 TABLET: 5; 325 TABLET ORAL at 02:28

## 2022-12-03 RX ADMIN — LORAZEPAM 0.5 MG: 0.5 TABLET ORAL at 13:57

## 2022-12-03 RX ADMIN — HYDROCODONE BITARTRATE AND ACETAMINOPHEN 1 TABLET: 5; 325 TABLET ORAL at 08:46

## 2022-12-03 RX ADMIN — Medication 100 MG: at 13:57

## 2022-12-03 RX ADMIN — MORPHINE SULFATE 4 MG: 2 INJECTION, SOLUTION INTRAMUSCULAR; INTRAVENOUS at 18:46

## 2022-12-03 RX ADMIN — SODIUM CHLORIDE, PRESERVATIVE FREE 10 ML: 5 INJECTION INTRAVENOUS at 21:07

## 2022-12-03 RX ADMIN — SODIUM CHLORIDE, POTASSIUM CHLORIDE, SODIUM LACTATE AND CALCIUM CHLORIDE: 600; 310; 30; 20 INJECTION, SOLUTION INTRAVENOUS at 18:50

## 2022-12-03 RX ADMIN — MORPHINE SULFATE 4 MG: 2 INJECTION, SOLUTION INTRAMUSCULAR; INTRAVENOUS at 15:21

## 2022-12-03 RX ADMIN — MORPHINE SULFATE 4 MG: 2 INJECTION, SOLUTION INTRAMUSCULAR; INTRAVENOUS at 11:07

## 2022-12-03 RX ADMIN — DIPHENHYDRAMINE HYDROCHLORIDE 25 MG: 50 INJECTION, SOLUTION INTRAMUSCULAR; INTRAVENOUS at 15:19

## 2022-12-03 RX ADMIN — SODIUM CHLORIDE, POTASSIUM CHLORIDE, SODIUM LACTATE AND CALCIUM CHLORIDE: 600; 310; 30; 20 INJECTION, SOLUTION INTRAVENOUS at 13:40

## 2022-12-03 RX ADMIN — APIXABAN 5 MG: 5 TABLET, FILM COATED ORAL at 21:07

## 2022-12-03 ASSESSMENT — PAIN DESCRIPTION - ONSET
ONSET: GRADUAL

## 2022-12-03 ASSESSMENT — PAIN - FUNCTIONAL ASSESSMENT
PAIN_FUNCTIONAL_ASSESSMENT: PREVENTS OR INTERFERES SOME ACTIVE ACTIVITIES AND ADLS
PAIN_FUNCTIONAL_ASSESSMENT: ACTIVITIES ARE NOT PREVENTED

## 2022-12-03 ASSESSMENT — PAIN DESCRIPTION - LOCATION
LOCATION: ABDOMEN

## 2022-12-03 ASSESSMENT — PAIN SCALES - GENERAL
PAINLEVEL_OUTOF10: 7
PAINLEVEL_OUTOF10: 0
PAINLEVEL_OUTOF10: 6
PAINLEVEL_OUTOF10: 3
PAINLEVEL_OUTOF10: 7
PAINLEVEL_OUTOF10: 5
PAINLEVEL_OUTOF10: 3
PAINLEVEL_OUTOF10: 5
PAINLEVEL_OUTOF10: 7
PAINLEVEL_OUTOF10: 3
PAINLEVEL_OUTOF10: 7
PAINLEVEL_OUTOF10: 7

## 2022-12-03 ASSESSMENT — PAIN DESCRIPTION - DESCRIPTORS
DESCRIPTORS: ACHING

## 2022-12-03 ASSESSMENT — PAIN DESCRIPTION - FREQUENCY
FREQUENCY: CONTINUOUS

## 2022-12-03 ASSESSMENT — PAIN DESCRIPTION - PAIN TYPE
TYPE: ACUTE PAIN

## 2022-12-03 ASSESSMENT — PAIN DESCRIPTION - ORIENTATION
ORIENTATION: MID

## 2022-12-03 NOTE — ED TRIAGE NOTES
Pt ambulatory to triage for reports of mid upper abdominal pain gradually worsening since yesterday. Pt reports having hx of pancreatitis & ulcers, reporting pain feels similar to previous episodes. Pt also endorsing multiple episodes of vomiting today. Pt dry heaving in triage. Pt a&ox4.

## 2022-12-03 NOTE — ASSESSMENT & PLAN NOTE
- Reports drinking alcohol 2 days ago  - Has chart h/o alcohol abuse, but states he is not a daily drinker  - NPO  - PRN pain meds

## 2022-12-03 NOTE — ED PROVIDER NOTES
Emergency Department Provider Note                   PCP:                Elen Perez MD               Age: 30 y.o. Sex: male       ICD-10-CM    1. Acute pancreatitis, unspecified complication status, unspecified pancreatitis type  K85.90           DISPOSITION Decision To Admit 12/03/2022 12:32:21 AM        MDM  Number of Diagnoses or Management Options  Acute pancreatitis, unspecified complication status, unspecified pancreatitis type: new, needed workup  Diagnosis management comments: 68-year-old male presents with upper abdominal pain. Lipase 989, CBC unremarkable CMP shows mild hypokalemia and elevated AST. CT abdomen pelvis shows acute pancreatitis. Patient was admitted to the hospitalist service for pain control and fluids. Amount and/or Complexity of Data Reviewed  Clinical lab tests: ordered and reviewed  Tests in the radiology section of CPT®: ordered and reviewed    Risk of Complications, Morbidity, and/or Mortality  Presenting problems: moderate  Diagnostic procedures: moderate  Management options: moderate    Patient Progress  Patient progress: stable       ED Course as of 12/03/22 0654   Fri Dec 02, 2022   2215 Lipase 989. CBC unremarkable. CMP shows mild hypokalemia and elevated AST. [CJ]   2319 CT abd/pelvis:  IMPRESSION:     -Peripancreatic stranding adjacent to the pancreatic head, consistent with acute  pancreatitis. No discrete peripancreatic fluid collection.     -Reactive inflammation of the duodenum. [CJ]   1133 Discussed CT findings with patient, states that his pain is still uncontrolled. Will reach out to hospital ist for admission.   [CJ]      ED Course User Index  [CJ] Huey Caputo, APRN - CNP        Orders Placed This Encounter   Procedures    CT ABDOMEN PELVIS WO CONTRAST Additional Contrast? None    CBC with Diff    CMP    Lipase    Lactate Dehydrogenase    C-Reactive Protein    Lactic Acid    Comprehensive Metabolic Panel w/ Reflex to MG    CBC with Auto Differential    Diet NPO Exceptions are: Ice Chips, Sips of Water with Meds, Sips of Clear Liquids    POCT Urine Dipstick    Vital signs per unit routine    Admission/Observation order previously placed    Up with assistance    Full code    Initiate Oxygen Therapy Protocol    POCT Urinalysis no Micro    EKG 12 Lead (Select if Upper Abd Pain, or SOB, Diaphoresis or Tachy)    Saline lock IV    ADMIT TO INPATIENT        Medications   apixaban (ELIQUIS) tablet 5 mg (has no administration in time range)   citalopram (CELEXA) tablet 40 mg (has no administration in time range)   divalproex (DEPAKOTE ER) extended release tablet 500 mg (has no administration in time range)   sodium chloride flush 0.9 % injection 5-40 mL (has no administration in time range)   sodium chloride flush 0.9 % injection 5-40 mL (has no administration in time range)   0.9 % sodium chloride infusion (has no administration in time range)   ondansetron (ZOFRAN-ODT) disintegrating tablet 4 mg (has no administration in time range)     Or   ondansetron (ZOFRAN) injection 4 mg (has no administration in time range)   polyethylene glycol (GLYCOLAX) packet 17 g (has no administration in time range)   acetaminophen (TYLENOL) tablet 650 mg (has no administration in time range)     Or   acetaminophen (TYLENOL) suppository 650 mg (has no administration in time range)   lactated ringers infusion (has no administration in time range)   HYDROcodone-acetaminophen (NORCO) 5-325 MG per tablet 1 tablet (1 tablet Oral Given 12/3/22 0228)   morphine injection 2 mg (has no administration in time range)   diphenhydrAMINE (BENADRYL) injection 25 mg (25 mg IntraVENous Given 12/2/22 2112)   methylPREDNISolone sodium (SOLU-MEDROL) injection 125 mg (125 mg IntraVENous Given 12/2/22 2112)   0.9 % sodium chloride bolus (0 mLs IntraVENous Stopped 12/2/22 2325)   morphine sulfate (PF) injection 4 mg (4 mg IntraVENous Given 12/2/22 2128)   ondansetron (ZOFRAN) injection 4 mg (4 mg IntraVENous Given 12/2/22 2112)   HYDROmorphone HCl PF (DILAUDID) injection 1 mg (1 mg IntraVENous Given 12/2/22 2340)   0.9 % sodium chloride bolus (0 mLs IntraVENous Stopped 12/3/22 0120)   metoclopramide (REGLAN) injection 10 mg (10 mg IntraVENous Given 12/2/22 2340)       New Prescriptions    No medications on file        Milagros Junior is a 30 y.o. male who presents to the Emergency Department with chief complaint of    Chief Complaint   Patient presents with    Abdominal Pain      29-year-old male with past medical history of pancreatitis, bipolar disorder, DVT, SVT, depression presents with 2 days of upper abdominal pain radiating to his back. States he has a history of pancreatitis and this feels similar to previous instances of pancreatitis. States that he had onset of vomiting today. Denies fever or diarrhea. States that he last used alcohol yesterday. Reports that he smokes cigarettes recreationally uses marijuana. Abdominal Pain  Pain location:  Epigastric  Pain quality: aching, cramping and gnawing    Pain radiates to:  Does not radiate  Pain severity:  Mild  Onset quality:  Sudden  Duration:  2 days  Timing:  Constant  Progression:  Worsening  Chronicity:  New  Context: alcohol use and retching    Context: not awakening from sleep, not diet changes, not eating, not medication withdrawal, not previous surgeries, not recent illness, not sick contacts and not suspicious food intake    Relieved by:  Nothing  Worsened by:  Nothing  Ineffective treatments:  None tried  Associated symptoms: chills, fatigue, nausea and vomiting    Associated symptoms: no belching, no chest pain, no constipation, no cough, no diarrhea, no dysuria, no fever, no flatus, no hematemesis, no hematuria, no melena, no shortness of breath and no sore throat        Review of Systems   Constitutional:  Positive for chills and fatigue. Negative for fever.    HENT:  Negative for congestion, dental problem, ear discharge, ear pain, rhinorrhea, sinus pain and sore throat. Respiratory:  Negative for cough, chest tightness, shortness of breath and wheezing. Cardiovascular:  Negative for chest pain and palpitations. Gastrointestinal:  Positive for abdominal pain, nausea and vomiting. Negative for constipation, diarrhea, flatus, hematemesis and melena. Genitourinary:  Negative for decreased urine volume, difficulty urinating, dysuria, hematuria, testicular pain and urgency. Musculoskeletal:  Positive for back pain. Negative for arthralgias. Skin:  Negative for color change, pallor, rash and wound. Neurological:  Negative for dizziness, weakness and numbness. Psychiatric/Behavioral:  Negative for agitation, behavioral problems and confusion. All other systems reviewed and are negative. Past Medical History:   Diagnosis Date    Bipolar 1 disorder ([facility] [address]     depakote    Depression     with anxiety    DVT (deep venous thrombosis) ([facility] [address] 03/2022    after SVT ablation - right leg went to left lung is now on Eliquis BID    H/O cardiac radiofrequency ablation 03/2022    for SVT both sides of heart    Hx of blood clots     PTSD (post-traumatic stress disorder)     combat PTSD        Past Surgical History:   Procedure Laterality Date    CARDIAC SURGERY      heart ablation     COLONOSCOPY      ENDOSCOPY, COLON, DIAGNOSTIC      FINGER TRIGGER RELEASE      RADIOFREQUENCY ABLATION      for SVT     SHOULDER ARTHROSCOPY Left 6/29/2022    LEFT SHOULDER ARTHROSCOPY LABRAL REPAIR/CAPSULORRHAPHY performed by Mary Barclay MD at [address] [place] [address]        No family history on file. Social History     Socioeconomic History    Marital status: Single   Tobacco Use    Smoking status: Never    Smokeless tobacco: Current     Types: Snuff    Tobacco comments:     3 cans per week    Vaping Use    Vaping Use: Never used   Substance and Sexual Activity    Alcohol use: Yes     Alcohol/week: 25.0 standard drinks     Types: 25 Cans of beer per week    Drug use:  Yes Types: Marijuana (Weed)     Comment: last used 6/23/2022          Contrast [iodides], Acetaminophen, Adhesive tape, Ibuprofen, and Other     Previous Medications    ACETAMINOPHEN (TYLENOL) 500 MG TABLET    Take 500 mg by mouth every 6 hours as needed for Pain    CITALOPRAM (CELEXA) 40 MG TABLET    Take 40 mg by mouth daily     DIVALPROEX (DEPAKOTE ER) 500 MG EXTENDED RELEASE TABLET    Take 500 mg by mouth    ELIQUIS 5 MG TABS TABLET    Take 5 mg by mouth 2 times daily     GABAPENTIN (NEURONTIN) 300 MG CAPSULE    TAKE 1 CAPSULE BY MOUTH THREE TIMES DAILY FOR 7 DAYS    ONDANSETRON (ZOFRAN) 4 MG TABLET    Take 1 tablet by mouth every 4-6 hours as needed for Nausea or Vomiting        Vitals signs and nursing note reviewed. Patient Vitals for the past 4 hrs:   BP SpO2   12/03/22 0528 121/79 92 %   12/03/22 0501 112/76 93 %   12/03/22 0430 111/81 93 %   12/03/22 0358 115/80 92 %   12/03/22 0328 117/79 94 %   12/03/22 0258 118/78 95 %          Physical Exam  Vitals and nursing note reviewed. Constitutional:       General: He is not in acute distress. Appearance: He is well-developed and normal weight. He is ill-appearing and diaphoretic. He is not toxic-appearing. HENT:      Head: Normocephalic and atraumatic. Mouth/Throat:      Mouth: Mucous membranes are moist.      Pharynx: Oropharynx is clear. Eyes:      Extraocular Movements: Extraocular movements intact. Pupils: Pupils are equal, round, and reactive to light. Cardiovascular:      Rate and Rhythm: Normal rate and regular rhythm. Heart sounds: Normal heart sounds. No murmur heard. No friction rub. No gallop. Pulmonary:      Effort: Pulmonary effort is normal. No respiratory distress. Breath sounds: Normal breath sounds. No stridor. No wheezing, rhonchi or rales. Chest:      Chest wall: No tenderness. Abdominal:      General: Abdomen is flat. Bowel sounds are normal. There is no distension. There are no signs of injury. Palpations: Abdomen is soft. Tenderness: There is abdominal tenderness in the right upper quadrant. There is guarding. There is no right CVA tenderness, left CVA tenderness or rebound. Hernia: No hernia is present. Skin:     General: Skin is warm. Capillary Refill: Capillary refill takes less than 2 seconds. Coloration: Skin is not cyanotic, jaundiced, mottled or pale. Findings: No erythema or rash. Neurological:      General: No focal deficit present. Mental Status: He is alert and oriented to person, place, and time. Cranial Nerves: No cranial nerve deficit. Motor: No weakness. Psychiatric:         Mood and Affect: Mood normal. Mood is not anxious or depressed. Behavior: Behavior normal.        Procedures    Results for orders placed or performed during the hospital encounter of 12/02/22   CT ABDOMEN PELVIS WO CONTRAST Additional Contrast? None    Narrative    EXAMINATION: CT ABDOMEN PELVIS WO CONTRAST 12/2/2022 9:58 PM    ACCESSION NUMBER: [id number]    COMPARISON: CT abdomen/pelvis 6/6/2021. INDICATION: History of pancreatitis reporting upper abdominal pain with nausea  vomiting    TECHNIQUE: Contiguous axial computed tomographic images were obtained from the  domes of the diaphragm to the symphysis pubis without intravenous contrast.  Coronal and sagittal reformats are provided. Please note that the detection of solid organ and vascular abnormalities is  limited in the absence of intravenous contrast.    Radiation dose reduction techniques were used for this study. Our CT scanners  use one or all of the following: Automated exposure control, adjustment of the  mA and/or kV according to patient size, iterative reconstruction. FINDINGS:  LUNG BASES: The visualized lung bases are clear. LIVER: The liver contour is normal. Small hypoattenuating area along the  posterolateral ligament, likely reflecting focal steatosis.   No suspicious liver  lesion. BILIARY TREE: The gallbladder is within normal limits. No biliary dilation. SPLEEN: Normal.    PANCREAS: Stranding adjacent to the pancreatic head. No discrete peripancreatic  fluid collection. No pancreatic mass or pancreatic ductal dilatation. ADRENALS: Normal.    KIDNEYS/BLADDER: Small bilateral nonobstructive renal calculi. No  hydronephrosis. No ureteral calculus. The urinary bladder is nondistended,  somewhat limiting its evaluation. BOWEL: The colon is unremarkable. The small bowel is normal in caliber. Reactive wall thickening of the duodenum as it courses adjacent to the  pancreatic head. No other bowel wall thickening. APPENDIX: The appendix is normal.    PERITONEUM/RETROPERITONEUM: No ascites or free air. No enlarged abdominal or  pelvic lymph nodes. VESSELS: The abdominal aorta is normal in caliber. ABDOMINAL WALL: Unremarkable. REPRODUCTIVE: Unremarkable. BONES: No suspicious osseous lesion. No acute osseous abnormality. Impression    -Peripancreatic stranding adjacent to the pancreatic head, consistent with acute  pancreatitis. No discrete peripancreatic fluid collection.    -Reactive inflammation of the duodenum.          CBC with Diff   Result Value Ref Range    WBC 8.7 4.3 - 11.1 K/uL    RBC 4.73 4.23 - 5.6 M/uL    Hemoglobin 15.9 13.6 - 17.2 g/dL    Hematocrit 45.6 41.1 - 50.3 %    MCV 96.4 82 - 102 FL    MCH 33.6 (H) 26.1 - 32.9 PG    MCHC 34.9 31.4 - 35.0 g/dL    RDW 11.6 (L) 11.9 - 14.6 %    Platelets 434 654 - 764 K/uL    MPV 9.6 9.4 - 12.3 FL    nRBC 0.00 0.0 - 0.2 K/uL    Differential Type AUTOMATED      Seg Neutrophils 76 43 - 78 %    Lymphocytes 16 13 - 44 %    Monocytes 7 4.0 - 12.0 %    Eosinophils % 1 0.5 - 7.8 %    Basophils 0 0.0 - 2.0 %    Immature Granulocytes 0 0.0 - 5.0 %    Segs Absolute 6.6 1.7 - 8.2 K/UL    Absolute Lymph # 1.4 0.5 - 4.6 K/UL    Absolute Mono # 0.6 0.1 - 1.3 K/UL    Absolute Eos # 0.0 0.0 - 0.8 K/UL Basophils Absolute 0.0 0.0 - 0.2 K/UL    Absolute Immature Granulocyte 0.0 0.0 - 0.5 K/UL   CMP   Result Value Ref Range    Sodium 134 133 - 143 mmol/L    Potassium 3.3 (L) 3.5 - 5.1 mmol/L    Chloride 100 (L) 101 - 110 mmol/L    CO2 27 21 - 32 mmol/L    Anion Gap 7 2 - 11 mmol/L    Glucose 103 (H) 65 - 100 mg/dL    BUN 11 6 - 23 MG/DL    Creatinine 1.00 0.8 - 1.5 MG/DL    Est, Glom Filt Rate >60 >60 ml/min/1.73m2    Calcium 9.0 8.3 - 10.4 MG/DL    Total Bilirubin 0.7 0.2 - 1.1 MG/DL    ALT 55 12 - 65 U/L    AST 38 (H) 15 - 37 U/L    Alk Phosphatase 83 50 - 136 U/L    Total Protein 7.8 6.3 - 8.2 g/dL    Albumin 4.2 3.5 - 5.0 g/dL    Globulin 3.6 2.8 - 4.5 g/dL    Albumin/Globulin Ratio 1.2 0.4 - 1.6     Lipase   Result Value Ref Range    Lipase 989 (H) 73 - 393 U/L   Lactate Dehydrogenase   Result Value Ref Range     (H) 100 - 190 U/L   C-Reactive Protein   Result Value Ref Range    CRP <0.3 0.0 - 0.9 mg/dL   Lactic Acid   Result Value Ref Range    Lactic Acid, Plasma 0.4 0.4 - 2.0 MMOL/L   POCT Urinalysis no Micro   Result Value Ref Range    Specific Gravity, Urine, POC >1.030 (H) 1.001 - 1.023    pH, Urine, POC 6.5 5.0 - 9.0      Protein, Urine, POC 30 (A) NEG mg/dL    Glucose, UA POC Negative NEG mg/dL    Ketones, Urine, POC 15 (A) NEG mg/dL    Bilirubin, Urine, POC SMALL (A) NEG      Blood, UA POC Trace Intact (A) NEG      URINE UROBILINOGEN POC 0.2 0.2 - 1.0 EU/dL    Nitrate, Urine, POC Negative NEG      Leukocyte Est, UA POC Negative NEG      Performed by: [employer]    EKG 12 Lead (Select if Upper Abd Pain, or SOB, Diaphoresis or Tachy)   Result Value Ref Range    Ventricular Rate 73 BPM    Atrial Rate 73 BPM    P-R Interval 132 ms    QRS Duration 90 ms    Q-T Interval 390 ms    QTc Calculation (Bazett) 429 ms    P Axis 22 degrees    R Axis 64 degrees    T Axis 44 degrees    Diagnosis Normal sinus rhythm         CT ABDOMEN PELVIS WO CONTRAST Additional Contrast? None   Final Result -Peripancreatic stranding adjacent to the pancreatic head, consistent with acute   pancreatitis. No discrete peripancreatic fluid collection.      -Reactive inflammation of the duodenum. Voice dictation software was used during the making of this note. This software is not perfect and grammatical and other typographical errors may be present. This note has not been completely proofread for errors.      Janice Barr, JARAD - LORETA  12/03/22 [id number]

## 2022-12-03 NOTE — PROGRESS NOTES
TRANSFER - IN REPORT:    Verbal report received from YUSUF Albarado  on Oneda Sides  being received from ED for routine progression of patient care      Report consisted of patient's Situation, Background, Assessment and   Recommendations(SBAR). Information from the following report(s) Nurse Handoff Report was reviewed with the receiving nurse. Opportunity for questions and clarification was provided. Assessment completed upon patient's arrival to unit and care assumed.

## 2022-12-03 NOTE — H&P
Hospitalist History and Physical   Admit Date:  2022  8:32 PM   Name:  Sania Ash   Age:  30 y.o. Sex:  male  :  1985   MRN:  [id number]     Presenting Complaint: abdominal pain  Reason(s) for Admission: Acute pancreatitis, unspecified complication status, unspecified pancreatitis type [K85.90]     History of Present Illness:   Sania Ash is a 30 y.o. male who presented to ED with abdominal pain. Pain is located in mid-epigastrium. Started yesterday. Reports drinking alcohol 2 days ago. Does have h/o pancreatitis in the past and this feels similar. Associate vomiting. Upon ER evaluation, lipase is 989. CT A/P shows:  -Peripancreatic stranding adjacent to the pancreatic head, consistent with acute   pancreatitis. No discrete peripancreatic fluid collection.       -Reactive inflammation of the duodenum. Hospitalist to admit. Review of Systems:  10 systems reviewed and negative except as noted in HPI. Assessment & Plan:   * Acute pancreatitis, unspecified complication status, unspecified pancreatitis type  Assessment & Plan  - Reports drinking alcohol 2 days ago  - Has chart h/o alcohol abuse, but states he is not a daily drinker  - NPO  - PRN pain meds    Mood disorder ([facility] [address]  Assessment & Plan  - Of note  - H/O bipolar and PTSD    Chronic pain disorder  Assessment & Plan  - Of note    Alcohol abuse  Assessment & Plan  - Of note    Dvt femoral (deep venous thrombosis) ([facility])  Assessment & Plan   - Continue home eliquis      Disposition: inpatient      Past medical history reviewed.     Past Medical History:   Diagnosis Date    Bipolar 1 disorder ([facility]     depakote    Depression     with anxiety    DVT (deep venous thrombosis) ([facility] [address] 2022    after SVT ablation - right leg went to left lung is now on Eliquis BID    H/O cardiac radiofrequency ablation 2022    for SVT both sides of heart    Hx of blood clots     PTSD (post-traumatic stress disorder)     combat PTSD     Past surgical history reviewed. Past Surgical History:   Procedure Laterality Date    CARDIAC SURGERY      heart ablation     COLONOSCOPY      ENDOSCOPY, COLON, DIAGNOSTIC      FINGER TRIGGER RELEASE      RADIOFREQUENCY ABLATION      for SVT     SHOULDER ARTHROSCOPY Left 6/29/2022    LEFT SHOULDER ARTHROSCOPY LABRAL REPAIR/CAPSULORRHAPHY performed by Michelle Granados MD at [facility]      Allergies   Allergen Reactions    Contrast [Iodides] Hives, Swelling and Rash    Acetaminophen Nausea Only     Patient denies allergy: takes acetaminophen daily    Adhesive Tape Itching    Ibuprofen Other (See Comments)     GI bleeding      Other Nausea Only     Opioid pain meds - all of them cause nausea stephanie IV       Social History     Tobacco Use    Smoking status: Never    Smokeless tobacco: Current     Types: Snuff    Tobacco comments:     3 cans per week    Substance Use Topics    Alcohol use: Yes     Alcohol/week: 25.0 standard drinks     Types: 25 Cans of beer per week      No family history on file. Family history reviewed and noncontributory to patient's acute condition; no relevant family history unless otherwise noted above. There is no immunization history on file for this patient.   PTA Medications:  Current Outpatient Medications   Medication Instructions    acetaminophen (TYLENOL) 500 mg, Oral, EVERY 6 HOURS PRN    citalopram (CELEXA) 40 mg, Oral, DAILY    divalproex (DEPAKOTE ER) 500 mg, Oral    Eliquis 5 mg, Oral, 2 TIMES DAILY    gabapentin (NEURONTIN) 300 MG capsule TAKE 1 CAPSULE BY MOUTH THREE TIMES DAILY FOR 7 DAYS    ondansetron (ZOFRAN) 4 mg, Oral, EVERY 4-6 HOURS PRN       Objective:   Patient Vitals for the past 24 hrs:   Temp Pulse Resp BP SpO2   12/03/22 0528 -- -- -- 121/79 92 %   12/03/22 0501 -- -- -- 112/76 93 %   12/03/22 0430 -- -- -- 111/81 93 %   12/03/22 0358 -- -- -- 115/80 92 %   12/03/22 0328 -- -- -- 117/79 94 %   12/03/22 0258 -- -- -- 118/78 95 %   12/03/22 0226 -- -- -- 105/65 94 % 12/03/22 0156 -- -- -- 113/60 93 %   12/03/22 0146 -- -- -- (!) 114/58 94 %   12/02/22 2345 -- 76 -- 124/76 95 %   12/02/22 2330 -- -- -- (!) 109/97 95 %   12/02/22 2315 -- -- -- 137/77 96 %   12/02/22 2300 -- -- -- 127/85 96 %   12/02/22 2245 -- -- -- 127/79 96 %   12/02/22 2014 98.1 °F (36.7 °C) 91 18 (!) 158/107 96 %          Estimated body mass index is 28.13 kg/m² as calculated from the following:    Height as of this encounter: 5' 8\" (1.727 m). Weight as of this encounter: 185 lb (83.9 kg). No intake or output data in the 24 hours ending 12/03/22 0716      Physical Exam:  General:    Well nourished. No overt distress  Head:  Normocephalic, atraumatic  Eyes:  Sclerae appear normal.  Pupils equally round. HENT:  Nares appear normal, no drainage. Moist mucous membranes  Neck:  No restricted ROM. Trachea midline  CV:   RRR. S1/S2 auscultated  Lungs:   CTAB. No wheezing, rhonchi, or rales. Appears even, unlabored  Abdomen: Bowel sounds present. Soft, nondistended. Extremities: Warm and dry. No cyanosis or clubbing. No edema. Skin:     No rashes. Normal turgor. Normal coloration  Neuro:  Cranial nerves II-XII grossly intact. Sensation intact  Psych:  Normal mood and affect.   Alert and oriented x3    Data Ordered and Personally Reviewed:    Last 24hr Labs:  Recent Results (from the past 24 hour(s))   EKG 12 Lead (Select if Upper Abd Pain, or SOB, Diaphoresis or Tachy)    Collection Time: 12/02/22  8:33 PM   Result Value Ref Range    Ventricular Rate 73 BPM    Atrial Rate 73 BPM    P-R Interval 132 ms    QRS Duration 90 ms    Q-T Interval 390 ms    QTc Calculation (Bazett) 429 ms    P Axis 22 degrees    R Axis 64 degrees    T Axis 44 degrees    Diagnosis Normal sinus rhythm    CBC with Diff    Collection Time: 12/02/22  8:44 PM   Result Value Ref Range    WBC 8.7 4.3 - 11.1 K/uL    RBC 4.73 4.23 - 5.6 M/uL    Hemoglobin 15.9 13.6 - 17.2 g/dL    Hematocrit 45.6 41.1 - 50.3 %    MCV 96.4 82 - 102 FL    MCH 33.6 (H) 26.1 - 32.9 PG    MCHC 34.9 31.4 - 35.0 g/dL    RDW 11.6 (L) 11.9 - 14.6 %    Platelets 115 346 - 744 K/uL    MPV 9.6 9.4 - 12.3 FL    nRBC 0.00 0.0 - 0.2 K/uL    Differential Type AUTOMATED      Seg Neutrophils 76 43 - 78 %    Lymphocytes 16 13 - 44 %    Monocytes 7 4.0 - 12.0 %    Eosinophils % 1 0.5 - 7.8 %    Basophils 0 0.0 - 2.0 %    Immature Granulocytes 0 0.0 - 5.0 %    Segs Absolute 6.6 1.7 - 8.2 K/UL    Absolute Lymph # 1.4 0.5 - 4.6 K/UL    Absolute Mono # 0.6 0.1 - 1.3 K/UL    Absolute Eos # 0.0 0.0 - 0.8 K/UL    Basophils Absolute 0.0 0.0 - 0.2 K/UL    Absolute Immature Granulocyte 0.0 0.0 - 0.5 K/UL   CMP    Collection Time: 12/02/22  8:44 PM   Result Value Ref Range    Sodium 134 133 - 143 mmol/L    Potassium 3.3 (L) 3.5 - 5.1 mmol/L    Chloride 100 (L) 101 - 110 mmol/L    CO2 27 21 - 32 mmol/L    Anion Gap 7 2 - 11 mmol/L    Glucose 103 (H) 65 - 100 mg/dL    BUN 11 6 - 23 MG/DL    Creatinine 1.00 0.8 - 1.5 MG/DL    Est, Glom Filt Rate >60 >60 ml/min/1.73m2    Calcium 9.0 8.3 - 10.4 MG/DL    Total Bilirubin 0.7 0.2 - 1.1 MG/DL    ALT 55 12 - 65 U/L    AST 38 (H) 15 - 37 U/L    Alk Phosphatase 83 50 - 136 U/L    Total Protein 7.8 6.3 - 8.2 g/dL    Albumin 4.2 3.5 - 5.0 g/dL    Globulin 3.6 2.8 - 4.5 g/dL    Albumin/Globulin Ratio 1.2 0.4 - 1.6     Lipase    Collection Time: 12/02/22  8:44 PM   Result Value Ref Range    Lipase 989 (H) 73 - 393 U/L   Lactate Dehydrogenase    Collection Time: 12/02/22  8:44 PM   Result Value Ref Range     (H) 100 - 190 U/L   C-Reactive Protein    Collection Time: 12/02/22  8:44 PM   Result Value Ref Range    CRP <0.3 0.0 - 0.9 mg/dL   POCT Urinalysis no Micro    Collection Time: 12/02/22  9:32 PM   Result Value Ref Range    Specific Gravity, Urine, POC >1.030 (H) 1.001 - 1.023    pH, Urine, POC 6.5 5.0 - 9.0      Protein, Urine, POC 30 (A) NEG mg/dL    Glucose, UA POC Negative NEG mg/dL    Ketones, Urine, POC 15 (A) NEG mg/dL    Bilirubin, Urine, POC SMALL (A) NEG      Blood, UA POC Trace Intact (A) NEG      URINE UROBILINOGEN POC 0.2 0.2 - 1.0 EU/dL    Nitrate, Urine, POC Negative NEG      Leukocyte Est, UA POC Negative NEG      Performed by: Dionne Parnell    Lactic Acid    Collection Time: 12/02/22 11:30 PM   Result Value Ref Range    Lactic Acid, Plasma 0.4 0.4 - 2.0 MMOL/L   Comprehensive Metabolic Panel w/ Reflex to MG    Collection Time: 12/03/22  6:00 AM   Result Value Ref Range    Sodium 135 133 - 143 mmol/L    Potassium 4.1 3.5 - 5.1 mmol/L    Chloride 104 101 - 110 mmol/L    CO2 26 21 - 32 mmol/L    Anion Gap 5 2 - 11 mmol/L    Glucose 158 (H) 65 - 100 mg/dL    BUN 9 6 - 23 MG/DL    Creatinine 0.90 0.8 - 1.5 MG/DL    Est, Glom Filt Rate >60 >60 ml/min/1.73m2    Calcium 7.9 (L) 8.3 - 10.4 MG/DL    Total Bilirubin 0.7 0.2 - 1.1 MG/DL    ALT 42 12 - 65 U/L    AST 29 15 - 37 U/L    Alk Phosphatase 69 50 - 136 U/L    Total Protein 6.6 6.3 - 8.2 g/dL    Albumin 3.5 3.5 - 5.0 g/dL    Globulin 3.1 2.8 - 4.5 g/dL    Albumin/Globulin Ratio 1.1 0.4 - 1.6         Signed:  Sherri Dimas MD

## 2022-12-03 NOTE — PROGRESS NOTES
Hospitalist Progress Note   Admit Date:  2022  8:32 PM   Name:  Kaleb Philip   Age:  30 y.o. Sex:  male  :  1985   MRN:  [id number]   Room:  [facility]/    Presenting Complaint: Abdominal Pain     Reason(s) for Admission: Acute pancreatitis, unspecified complication status, unspecified pancreatitis type SPECIALTY [facility] Course:   Copied from admission history and physical HPI:  Kaleb Philip is a 30 y.o. male who presented to ED with abdominal pain. Pain is located in mid-epigastrium. Started yesterday. Reports drinking alcohol 2 days ago. Does have h/o pancreatitis in the past and this feels similar. Associate vomiting. Upon ER evaluation, lipase is 989. CT A/P shows:  -Peripancreatic stranding adjacent to the pancreatic head, consistent with acute   pancreatitis. No discrete peripancreatic fluid collection.       -Reactive inflammation of the duodenum. Hospitalist to admit. Subjective & 24hr Events (22): Patient admits to daily alcohol use follows at [address] [place] [address] and interested in pursuing alcohol cessation programs but has not yet been referred. Last alcoholic drink was Friday morning . No significant alcohol withdrawal symptoms in the past but multiple episodes of pancreatitis. Currently NPO. Receiving aggressive lactated Ringer's IV hydration-lipase just met criteria at around 2-1/2 times upper limit normal.  No tremor. CT abdomen no significant obstructive gallbladder process obvious. No right upper quadrant localized tenderness. He does take Depakote category Ia risk drug for pancreatitis. This is for bipolar disorder. Review of Systems:  10 systems reviewed and negative except as noted in HPI.   Assessment & Plan:   * Acute pancreatitis, unspecified complication status, unspecified pancreatitis type  Assessment & Plan  - Reports drinking alcohol 2 days ago  - Has chart h/o alcohol abuse, but states he is not a daily drinker  - NPO  - PRN pain meds  12/3--- continue n.p.o. consider clear liquids in the a.m. if stable. Aggressive lactated Ringer's IV hydration. Mood disorder ([facility] [address]  Assessment & Plan  - Of note  - H/O bipolar and PTSD  12/3--discussed with patient after resolution of acute pancreatitis may need to discuss alternate med for bipolar disorder regarding recurrent pancreatitis and risk of drug possibly contributing but most likely alcohol pancreatitis based on his history     Chronic pain disorder  Assessment & Plan  - Of note  12/3--- he denies recent chronic narcotics-most recent oral narcotics were related to shoulder surgery but he denies daily use. Alcohol abuse  Assessment & Plan  - Of note  12/3--- thiamine daily, Ativan as needed. Monitor     Dvt femoral (deep venous thrombosis) ([facility])  Assessment & Plan   - Continue home eliquis        Disposition: inpatient               Diet:  Diet NPO Exceptions are: Ice Chips, Sips of Water with Meds, Sips of Clear Liquids  DVT PPx: Eliquis  Code status: Full Code    Hospital Problems:  Principal Problem:    Acute pancreatitis, unspecified complication status, unspecified pancreatitis type  Active Problems:    Dvt femoral (deep venous thrombosis) ([facility])    Alcohol abuse    Chronic pain disorder    Mood disorder ([facility] [address]  Resolved Problems:    * No resolved hospital problems.  *      Objective:   Patient Vitals for the past 24 hrs:   Temp Pulse Resp BP SpO2   12/03/22 1322 98.1 °F (36.7 °C) 67 17 (!) 145/90 95 %   12/03/22 1200 98 °F (36.7 °C) 70 18 114/64 --   12/03/22 1137 -- -- 18 -- --   12/03/22 1107 -- -- 18 -- --   12/03/22 0846 98 °F (36.7 °C) 78 18 110/78 100 %   12/03/22 0528 -- -- -- 121/79 92 %   12/03/22 0501 -- -- -- 112/76 93 %   12/03/22 0430 -- -- -- 111/81 93 %   12/03/22 0358 -- -- -- 115/80 92 %   12/03/22 0328 -- -- -- 117/79 94 %   12/03/22 0258 -- -- -- 118/78 95 %   12/03/22 0226 -- -- -- 105/65 94 %   12/03/22 0156 -- -- -- 113/60 93 %   12/03/22 0146 -- -- -- (!) 114/58 94 %   12/02/22 2345 -- 76 -- 124/76 95 %   12/02/22 2330 -- -- -- (!) 109/97 95 %   12/02/22 2315 -- -- -- 137/77 96 %   12/02/22 2300 -- -- -- 127/85 96 %   12/02/22 2245 -- -- -- 127/79 96 %   12/02/22 2014 98.1 °F (36.7 °C) 91 18 (!) 158/107 96 %       Oxygen Therapy  SpO2: 95 %  O2 Device: None (Room air)    Estimated body mass index is 28.13 kg/m² as calculated from the following:    Height as of this encounter: 5' 8\" (1.727 m). Weight as of this encounter: 185 lb (83.9 kg). No intake or output data in the 24 hours ending 12/03/22 1353      Physical Exam:     Blood pressure (!) 145/90, pulse 67, temperature 98.1 °F (36.7 °C), temperature source Oral, resp. rate 17, height 5' 8\" (1.727 m), weight 185 lb (83.9 kg), SpO2 95 %. General:    Well nourished. Head:  Normocephalic, atraumatic  Eyes:  Sclerae appear normal.  Pupils equally round. ENT:  Nares appear normal, no drainage. Moist oral mucosa  Neck:  No restricted ROM. Trachea midline   CV:   RRR. No m/r/g. No jugular venous distension. Lungs:   CTAB. No wheezing, rhonchi, or rales. Symmetric expansion. Abdomen: Bowel sounds present. Soft, he complains of diffuse tenderness primarily upper quadrants bilateral and mid epigastrium. No gross distention. Extremities: No cyanosis or clubbing. No edema  Skin:     No rashes and normal coloration. Warm and dry. Neuro:  CN II-XII grossly intact. Sensation intact. A&Ox3  Psych:  Normal mood and affect.       I have personally reviewed labs and tests showing:  Recent Labs:  Recent Results (from the past 48 hour(s))   EKG 12 Lead (Select if Upper Abd Pain, or SOB, Diaphoresis or Tachy)    Collection Time: 12/02/22  8:33 PM   Result Value Ref Range    Ventricular Rate 73 BPM    Atrial Rate 73 BPM    P-R Interval 132 ms    QRS Duration 90 ms    Q-T Interval 390 ms    QTc Calculation (Bazett) 429 ms    P Axis 22 degrees    R Axis 64 degrees    T Axis 44 degrees    Diagnosis Normal sinus rhythm CBC with Diff    Collection Time: 12/02/22  8:44 PM   Result Value Ref Range    WBC 8.7 4.3 - 11.1 K/uL    RBC 4.73 4.23 - 5.6 M/uL    Hemoglobin 15.9 13.6 - 17.2 g/dL    Hematocrit 45.6 41.1 - 50.3 %    MCV 96.4 82 - 102 FL    MCH 33.6 (H) 26.1 - 32.9 PG    MCHC 34.9 31.4 - 35.0 g/dL    RDW 11.6 (L) 11.9 - 14.6 %    Platelets 630 987 - 617 K/uL    MPV 9.6 9.4 - 12.3 FL    nRBC 0.00 0.0 - 0.2 K/uL    Differential Type AUTOMATED      Seg Neutrophils 76 43 - 78 %    Lymphocytes 16 13 - 44 %    Monocytes 7 4.0 - 12.0 %    Eosinophils % 1 0.5 - 7.8 %    Basophils 0 0.0 - 2.0 %    Immature Granulocytes 0 0.0 - 5.0 %    Segs Absolute 6.6 1.7 - 8.2 K/UL    Absolute Lymph # 1.4 0.5 - 4.6 K/UL    Absolute Mono # 0.6 0.1 - 1.3 K/UL    Absolute Eos # 0.0 0.0 - 0.8 K/UL    Basophils Absolute 0.0 0.0 - 0.2 K/UL    Absolute Immature Granulocyte 0.0 0.0 - 0.5 K/UL   CMP    Collection Time: 12/02/22  8:44 PM   Result Value Ref Range    Sodium 134 133 - 143 mmol/L    Potassium 3.3 (L) 3.5 - 5.1 mmol/L    Chloride 100 (L) 101 - 110 mmol/L    CO2 27 21 - 32 mmol/L    Anion Gap 7 2 - 11 mmol/L    Glucose 103 (H) 65 - 100 mg/dL    BUN 11 6 - 23 MG/DL    Creatinine 1.00 0.8 - 1.5 MG/DL    Est, Glom Filt Rate >60 >60 ml/min/1.73m2    Calcium 9.0 8.3 - 10.4 MG/DL    Total Bilirubin 0.7 0.2 - 1.1 MG/DL    ALT 55 12 - 65 U/L    AST 38 (H) 15 - 37 U/L    Alk Phosphatase 83 50 - 136 U/L    Total Protein 7.8 6.3 - 8.2 g/dL    Albumin 4.2 3.5 - 5.0 g/dL    Globulin 3.6 2.8 - 4.5 g/dL    Albumin/Globulin Ratio 1.2 0.4 - 1.6     Lipase    Collection Time: 12/02/22  8:44 PM   Result Value Ref Range    Lipase 989 (H) 73 - 393 U/L   Lactate Dehydrogenase    Collection Time: 12/02/22  8:44 PM   Result Value Ref Range     (H) 100 - 190 U/L   C-Reactive Protein    Collection Time: 12/02/22  8:44 PM   Result Value Ref Range    CRP <0.3 0.0 - 0.9 mg/dL   POCT Urinalysis no Micro    Collection Time: 12/02/22  9:32 PM   Result Value Ref Range Specific Gravity, Urine, POC >1.030 (H) 1.001 - 1.023    pH, Urine, POC 6.5 5.0 - 9.0      Protein, Urine, POC 30 (A) NEG mg/dL    Glucose, UA POC Negative NEG mg/dL    Ketones, Urine, POC 15 (A) NEG mg/dL    Bilirubin, Urine, POC SMALL (A) NEG      Blood, UA POC Trace Intact (A) NEG      URINE UROBILINOGEN POC 0.2 0.2 - 1.0 EU/dL    Nitrate, Urine, POC Negative NEG      Leukocyte Est, UA POC Negative NEG      Performed by: Bhaskar Fernandes    Lactic Acid    Collection Time: 12/02/22 11:30 PM   Result Value Ref Range    Lactic Acid, Plasma 0.4 0.4 - 2.0 MMOL/L   Comprehensive Metabolic Panel w/ Reflex to MG    Collection Time: 12/03/22  6:00 AM   Result Value Ref Range    Sodium 135 133 - 143 mmol/L    Potassium 4.1 3.5 - 5.1 mmol/L    Chloride 104 101 - 110 mmol/L    CO2 26 21 - 32 mmol/L    Anion Gap 5 2 - 11 mmol/L    Glucose 158 (H) 65 - 100 mg/dL    BUN 9 6 - 23 MG/DL    Creatinine 0.90 0.8 - 1.5 MG/DL    Est, Glom Filt Rate >60 >60 ml/min/1.73m2    Calcium 7.9 (L) 8.3 - 10.4 MG/DL    Total Bilirubin 0.7 0.2 - 1.1 MG/DL    ALT 42 12 - 65 U/L    AST 29 15 - 37 U/L    Alk Phosphatase 69 50 - 136 U/L    Total Protein 6.6 6.3 - 8.2 g/dL    Albumin 3.5 3.5 - 5.0 g/dL    Globulin 3.1 2.8 - 4.5 g/dL    Albumin/Globulin Ratio 1.1 0.4 - 1.6     Lipid Panel    Collection Time: 12/03/22  8:28 AM   Result Value Ref Range    Cholesterol, Total 204 (H) <200 MG/DL    Triglycerides 138 35 - 150 MG/DL    HDL 87 (H) 40 - 60 MG/DL    LDL Calculated 89.4 <100 MG/DL    VLDL Cholesterol Calculated 27.6 (H) 6.0 - 23.0 MG/DL    Chol/HDL Ratio 2.3     POCT Glucose    Collection Time: 12/03/22  8:56 AM   Result Value Ref Range    POC Glucose 149 (H) 65 - 100 mg/dL    Performed by: Nikki Vasques        I have personally reviewed imaging studies showing: Other Studies:  CT ABDOMEN PELVIS WO CONTRAST Additional Contrast? None   Final Result      -Peripancreatic stranding adjacent to the pancreatic head, consistent with acute   pancreatitis. No discrete peripancreatic fluid collection.      -Reactive inflammation of the duodenum. Current Meds:  Current Facility-Administered Medications   Medication Dose Route Frequency    apixaban (ELIQUIS) tablet 5 mg  5 mg Oral BID    citalopram (CELEXA) tablet 40 mg  40 mg Oral Daily    divalproex (DEPAKOTE ER) extended release tablet 500 mg  500 mg Oral Daily    sodium chloride flush 0.9 % injection 5-40 mL  5-40 mL IntraVENous 2 times per day    sodium chloride flush 0.9 % injection 5-40 mL  5-40 mL IntraVENous PRN    0.9 % sodium chloride infusion   IntraVENous PRN    ondansetron (ZOFRAN-ODT) disintegrating tablet 4 mg  4 mg Oral Q8H PRN    Or    ondansetron (ZOFRAN) injection 4 mg  4 mg IntraVENous Q6H PRN    polyethylene glycol (GLYCOLAX) packet 17 g  17 g Oral Daily PRN    acetaminophen (TYLENOL) tablet 650 mg  650 mg Oral Q6H PRN    Or    acetaminophen (TYLENOL) suppository 650 mg  650 mg Rectal Q6H PRN    lactated ringers infusion   IntraVENous Continuous    HYDROcodone-acetaminophen (NORCO) 5-325 MG per tablet 1 tablet  1 tablet Oral Q6H PRN    morphine injection 4 mg  4 mg IntraVENous Q4H PRN    LORazepam (ATIVAN) tablet 0.5 mg  0.5 mg Oral Q6H PRN    thiamine tablet 100 mg  100 mg Oral Daily       Signed:  Stephanie Celis DO    Part of this note may have been written by using a voice dictation software. The note has been proof read but may still contain some grammatical/other typographical errors.

## 2022-12-03 NOTE — PLAN OF CARE
Problem: Discharge Planning  Goal: Discharge to home or other facility with appropriate resources  Outcome: Progressing  Flowsheets (Taken 12/3/2022 5813)  Discharge to home or other facility with appropriate resources: Identify barriers to discharge with patient and caregiver     Problem: Pain  Goal: Verbalizes/displays adequate comfort level or baseline comfort level  Outcome: Progressing     Problem: Safety - Adult  Goal: Free from fall injury  Outcome: Progressing

## 2022-12-04 LAB
ALBUMIN SERPL-MCNC: 3.2 G/DL (ref 3.5–5)
ALBUMIN/GLOB SERPL: 1.1 {RATIO} (ref 0.4–1.6)
ALP SERPL-CCNC: 63 U/L (ref 50–136)
ALT SERPL-CCNC: 32 U/L (ref 12–65)
ANION GAP SERPL CALC-SCNC: 5 MMOL/L (ref 2–11)
AST SERPL-CCNC: 19 U/L (ref 15–37)
BASOPHILS # BLD: 0 K/UL (ref 0–0.2)
BASOPHILS NFR BLD: 0 % (ref 0–2)
BILIRUB SERPL-MCNC: 0.8 MG/DL (ref 0.2–1.1)
BUN SERPL-MCNC: 5 MG/DL (ref 6–23)
CALCIUM SERPL-MCNC: 8.2 MG/DL (ref 8.3–10.4)
CHLORIDE SERPL-SCNC: 108 MMOL/L (ref 101–110)
CO2 SERPL-SCNC: 28 MMOL/L (ref 21–32)
CREAT SERPL-MCNC: 0.7 MG/DL (ref 0.8–1.5)
DIFFERENTIAL METHOD BLD: ABNORMAL
EOSINOPHIL # BLD: 0.1 K/UL (ref 0–0.8)
EOSINOPHIL NFR BLD: 1 % (ref 0.5–7.8)
ERYTHROCYTE [DISTWIDTH] IN BLOOD BY AUTOMATED COUNT: 11.5 % (ref 11.9–14.6)
GLOBULIN SER CALC-MCNC: 2.8 G/DL (ref 2.8–4.5)
GLUCOSE SERPL-MCNC: 109 MG/DL (ref 65–100)
HCT VFR BLD AUTO: 39.3 % (ref 41.1–50.3)
HGB BLD-MCNC: 13.4 G/DL (ref 13.6–17.2)
IMM GRANULOCYTES # BLD AUTO: 0 K/UL (ref 0–0.5)
IMM GRANULOCYTES NFR BLD AUTO: 0 % (ref 0–5)
LIPASE SERPL-CCNC: 2186 U/L (ref 73–393)
LYMPHOCYTES # BLD: 1.1 K/UL (ref 0.5–4.6)
LYMPHOCYTES NFR BLD: 18 % (ref 13–44)
MAGNESIUM SERPL-MCNC: 2.3 MG/DL (ref 1.8–2.4)
MCH RBC QN AUTO: 34.1 PG (ref 26.1–32.9)
MCHC RBC AUTO-ENTMCNC: 34.1 G/DL (ref 31.4–35)
MCV RBC AUTO: 100 FL (ref 82–102)
MONOCYTES # BLD: 0.5 K/UL (ref 0.1–1.3)
MONOCYTES NFR BLD: 8 % (ref 4–12)
NEUTS SEG # BLD: 4.4 K/UL (ref 1.7–8.2)
NEUTS SEG NFR BLD: 73 % (ref 43–78)
NRBC # BLD: 0 K/UL (ref 0–0.2)
PLATELET # BLD AUTO: 169 K/UL (ref 150–450)
PMV BLD AUTO: 9.5 FL (ref 9.4–12.3)
POTASSIUM SERPL-SCNC: 3.4 MMOL/L (ref 3.5–5.1)
PROT SERPL-MCNC: 6 G/DL (ref 6.3–8.2)
RBC # BLD AUTO: 3.93 M/UL (ref 4.23–5.6)
SODIUM SERPL-SCNC: 141 MMOL/L (ref 133–143)
WBC # BLD AUTO: 6.1 K/UL (ref 4.3–11.1)

## 2022-12-04 PROCEDURE — 6370000000 HC RX 637 (ALT 250 FOR IP): Performed by: INTERNAL MEDICINE

## 2022-12-04 PROCEDURE — 2580000003 HC RX 258: Performed by: FAMILY MEDICINE

## 2022-12-04 PROCEDURE — 1100000000 HC RM PRIVATE

## 2022-12-04 PROCEDURE — 36415 COLL VENOUS BLD VENIPUNCTURE: CPT

## 2022-12-04 PROCEDURE — 85025 COMPLETE CBC W/AUTO DIFF WBC: CPT

## 2022-12-04 PROCEDURE — 6370000000 HC RX 637 (ALT 250 FOR IP): Performed by: FAMILY MEDICINE

## 2022-12-04 PROCEDURE — 2580000003 HC RX 258: Performed by: INTERNAL MEDICINE

## 2022-12-04 PROCEDURE — 6360000002 HC RX W HCPCS: Performed by: INTERNAL MEDICINE

## 2022-12-04 PROCEDURE — 83690 ASSAY OF LIPASE: CPT

## 2022-12-04 PROCEDURE — 80053 COMPREHEN METABOLIC PANEL: CPT

## 2022-12-04 PROCEDURE — 83735 ASSAY OF MAGNESIUM: CPT

## 2022-12-04 RX ORDER — POTASSIUM CHLORIDE 20 MEQ/1
20 TABLET, EXTENDED RELEASE ORAL
Status: COMPLETED | OUTPATIENT
Start: 2022-12-04 | End: 2022-12-04

## 2022-12-04 RX ORDER — ENOXAPARIN SODIUM 100 MG/ML
40 INJECTION SUBCUTANEOUS EVERY 24 HOURS
Status: DISCONTINUED | OUTPATIENT
Start: 2022-12-05 | End: 2022-12-05 | Stop reason: HOSPADM

## 2022-12-04 RX ADMIN — HYDROCODONE BITARTRATE AND ACETAMINOPHEN 1 TABLET: 5; 325 TABLET ORAL at 16:20

## 2022-12-04 RX ADMIN — SODIUM CHLORIDE, POTASSIUM CHLORIDE, SODIUM LACTATE AND CALCIUM CHLORIDE: 600; 310; 30; 20 INJECTION, SOLUTION INTRAVENOUS at 14:00

## 2022-12-04 RX ADMIN — POTASSIUM CHLORIDE 20 MEQ: 1500 TABLET, EXTENDED RELEASE ORAL at 13:54

## 2022-12-04 RX ADMIN — POTASSIUM CHLORIDE 20 MEQ: 1500 TABLET, EXTENDED RELEASE ORAL at 11:58

## 2022-12-04 RX ADMIN — MORPHINE SULFATE 4 MG: 2 INJECTION, SOLUTION INTRAMUSCULAR; INTRAVENOUS at 04:10

## 2022-12-04 RX ADMIN — MORPHINE SULFATE 4 MG: 2 INJECTION, SOLUTION INTRAMUSCULAR; INTRAVENOUS at 09:29

## 2022-12-04 RX ADMIN — SODIUM CHLORIDE, POTASSIUM CHLORIDE, SODIUM LACTATE AND CALCIUM CHLORIDE: 600; 310; 30; 20 INJECTION, SOLUTION INTRAVENOUS at 23:44

## 2022-12-04 RX ADMIN — MORPHINE SULFATE 4 MG: 2 INJECTION, SOLUTION INTRAMUSCULAR; INTRAVENOUS at 18:49

## 2022-12-04 RX ADMIN — SODIUM CHLORIDE, PRESERVATIVE FREE 10 ML: 5 INJECTION INTRAVENOUS at 07:32

## 2022-12-04 RX ADMIN — HYDROCODONE BITARTRATE AND ACETAMINOPHEN 1 TABLET: 5; 325 TABLET ORAL at 22:52

## 2022-12-04 RX ADMIN — POTASSIUM CHLORIDE 20 MEQ: 1500 TABLET, EXTENDED RELEASE ORAL at 09:28

## 2022-12-04 RX ADMIN — SODIUM CHLORIDE, PRESERVATIVE FREE 5 ML: 5 INJECTION INTRAVENOUS at 20:18

## 2022-12-04 RX ADMIN — HYDROCODONE BITARTRATE AND ACETAMINOPHEN 1 TABLET: 5; 325 TABLET ORAL at 07:15

## 2022-12-04 RX ADMIN — DIVALPROEX SODIUM 500 MG: 500 TABLET, EXTENDED RELEASE ORAL at 07:15

## 2022-12-04 RX ADMIN — SODIUM CHLORIDE, POTASSIUM CHLORIDE, SODIUM LACTATE AND CALCIUM CHLORIDE: 600; 310; 30; 20 INJECTION, SOLUTION INTRAVENOUS at 07:19

## 2022-12-04 RX ADMIN — Medication 100 MG: at 07:15

## 2022-12-04 RX ADMIN — MORPHINE SULFATE 4 MG: 2 INJECTION, SOLUTION INTRAMUSCULAR; INTRAVENOUS at 13:54

## 2022-12-04 RX ADMIN — APIXABAN 5 MG: 5 TABLET, FILM COATED ORAL at 09:33

## 2022-12-04 RX ADMIN — SODIUM CHLORIDE, POTASSIUM CHLORIDE, SODIUM LACTATE AND CALCIUM CHLORIDE: 600; 310; 30; 20 INJECTION, SOLUTION INTRAVENOUS at 02:50

## 2022-12-04 RX ADMIN — CITALOPRAM HYDROBROMIDE 40 MG: 20 TABLET ORAL at 07:15

## 2022-12-04 ASSESSMENT — PAIN DESCRIPTION - ONSET: ONSET: GRADUAL

## 2022-12-04 ASSESSMENT — PAIN - FUNCTIONAL ASSESSMENT
PAIN_FUNCTIONAL_ASSESSMENT: ACTIVITIES ARE NOT PREVENTED

## 2022-12-04 ASSESSMENT — PAIN SCALES - GENERAL
PAINLEVEL_OUTOF10: 0
PAINLEVEL_OUTOF10: 7
PAINLEVEL_OUTOF10: 7
PAINLEVEL_OUTOF10: 0
PAINLEVEL_OUTOF10: 6
PAINLEVEL_OUTOF10: 6
PAINLEVEL_OUTOF10: 3
PAINLEVEL_OUTOF10: 8
PAINLEVEL_OUTOF10: 6
PAINLEVEL_OUTOF10: 6
PAINLEVEL_OUTOF10: 4
PAINLEVEL_OUTOF10: 3
PAINLEVEL_OUTOF10: 7

## 2022-12-04 ASSESSMENT — PAIN DESCRIPTION - ORIENTATION
ORIENTATION: MID
ORIENTATION: MID;UPPER

## 2022-12-04 ASSESSMENT — PAIN DESCRIPTION - DESCRIPTORS
DESCRIPTORS: ACHING
DESCRIPTORS: ACHING;TENDER
DESCRIPTORS: CRAMPING;ACHING
DESCRIPTORS: ACHING

## 2022-12-04 ASSESSMENT — PAIN DESCRIPTION - LOCATION
LOCATION: ABDOMEN

## 2022-12-04 ASSESSMENT — PAIN DESCRIPTION - FREQUENCY: FREQUENCY: INTERMITTENT

## 2022-12-04 ASSESSMENT — PAIN DESCRIPTION - PAIN TYPE: TYPE: ACUTE PAIN

## 2022-12-04 NOTE — PROGRESS NOTES
Hospitalist Progress Note   Admit Date:  2022  8:32 PM   Name:  Leonora Carroll   Age:  30 y.o. Sex:  male  :  1985   MRN:  [id number]   Room:  [facility]/    Presenting Complaint: Abdominal Pain     Reason(s) for Admission: Acute pancreatitis, unspecified complication status, unspecified pancreatitis type SPECIALTY [facility] Course:   Copied from admission history and physical HPI:  Leonora Carroll is a 30 y.o. male who presented to ED with abdominal pain. Pain is located in mid-epigastrium. Started yesterday. Reports drinking alcohol 2 days ago. Does have h/o pancreatitis in the past and this feels similar. Associate vomiting. Upon ER evaluation, lipase is 989. CT A/P shows:  -Peripancreatic stranding adjacent to the pancreatic head, consistent with acute   pancreatitis. No discrete peripancreatic fluid collection.       -Reactive inflammation of the duodenum. Hospitalist to admit. Subjective & 24hr Events (22): Still significant abdominal pain requiring IV analgesia but wants to try clear liquids. Discussed with patient notify us of pain with clear liquids and will discontinue but may advance. Continuing with aggressive IV hydration. He appears to understand. No significant alcohol withdrawal symptoms to date. He no longer takes Eliquis-he completed 3 months for DVT/PE and apparently he reports that he had a follow-up scan and was negative for persistent DVT. Discussed convert to Lovenox therapy tomorrow for DVT prophylaxis. No shaking chills or fevers. Remains alert and oriented x3. No significant increased abdominal pain but not much improvement. Subjective/notes from December 3 carried for continuity of care-continues to take Depakote. Patient admits to daily alcohol use follows at [place] and interested in pursuing alcohol cessation programs but has not yet been referred. Last alcoholic drink was Friday morning .   No significant alcohol withdrawal symptoms in the past but multiple episodes of pancreatitis. Currently NPO. Receiving aggressive lactated Ringer's IV hydration-lipase just met criteria at around 2-1/2 times upper limit normal.  No tremor. CT abdomen no significant obstructive gallbladder process obvious. No right upper quadrant localized tenderness. He does take Depakote category Ia risk drug for pancreatitis. This is for bipolar disorder. Review of Systems:  10 systems reviewed and negative except as noted in HPI. Assessment & Plan:   * Acute pancreatitis, unspecified complication status, unspecified pancreatitis type  Assessment & Plan  - Reports drinking alcohol 2 days ago  - Has chart h/o alcohol abuse, but states he is not a daily drinker  - NPO  - PRN pain meds  12/4--try clear liquids. Continue IV analgesia and aggressive IV hydration-replace electrolytes as needed     Mood disorder ([address]  Assessment & Plan  - Of note  - H/O bipolar and PTSD  12/3--discussed with patient after resolution of acute pancreatitis may need to discuss alternate med for bipolar disorder regarding recurrent pancreatitis and risk of drug possibly contributing but most likely alcohol pancreatitis based on his history  12/4--continue same for now. Chronic pain disorder  Assessment & Plan  - Of note  12/3--- he denies recent chronic narcotics-most recent oral narcotics were related to shoulder surgery but he denies daily use. 12/4--monitor closely currently receiving as needed benzodiazepine for alcohol withdrawal and systemic narcotics regarding acute pancreatitis. Alcohol abuse  Assessment & Plan  - Of note  12/3--- thiamine daily, Ativan as needed.   Monitor  12/4--no significant withdrawal symptoms thus far     Dvt femoral (deep venous thrombosis) ([facility])  Assessment & Plan   - Continue home eliquis  12/4----patient clarifies that he no longer takes Eliquis it was discontinued after he completed a 3-month course recently and had follow-up scan which was negative for persistent DVT. Will change to Lovenox once daily subcutaneous tomorrow for DVT prophylaxis. Disposition: inpatient               Diet:  ADULT DIET; Clear Liquid  DVT PPx: Eliquis  Code status: Full Code    Hospital Problems:  Principal Problem:    Acute pancreatitis, unspecified complication status, unspecified pancreatitis type  Active Problems:    Dvt femoral (deep venous thrombosis) (HCC)    Alcohol abuse    Chronic pain disorder    Mood disorder ([address]  Resolved Problems:    * No resolved hospital problems. *      Objective:   Patient Vitals for the past 24 hrs:   Temp Pulse Resp BP SpO2   12/04/22 1143 97.8 °F (36.6 °C) 74 16 (!) 130/92 96 %   12/04/22 0748 98.3 °F (36.8 °C) 66 16 131/83 96 %   12/04/22 0440 -- -- 16 -- --   12/04/22 0410 -- -- 16 -- --   12/04/22 0400 97.4 °F (36.3 °C) 65 16 132/89 --   12/04/22 0028 -- -- 16 -- --   12/04/22 0000 97.5 °F (36.4 °C) 73 16 123/77 95 %   12/03/22 2358 -- -- 16 -- --   12/03/22 2015 97.7 °F (36.5 °C) 68 16 136/82 --   12/03/22 1941 97.3 °F (36.3 °C) 68 18 121/85 97 %   12/03/22 1916 -- -- 16 -- --   12/03/22 1531 98.1 °F (36.7 °C) 64 -- 126/83 93 %         Oxygen Therapy  SpO2: 96 %  O2 Device: None (Room air)    Estimated body mass index is 28.13 kg/m² as calculated from the following:    Height as of this encounter: 5' 8\" (1.727 m). Weight as of this encounter: 185 lb (83.9 kg). Intake/Output Summary (Last 24 hours) at 12/4/2022 1436  Last data filed at 12/4/2022 0515  Gross per 24 hour   Intake --   Output 1100 ml   Net -1100 ml           Physical Exam:     Blood pressure (!) 130/92, pulse 74, temperature 97.8 °F (36.6 °C), temperature source Oral, resp. rate 16, height 5' 8\" (1.727 m), weight 185 lb (83.9 kg), SpO2 96 %.   General-alert and oriented x3, still significant pain  Eyes-conjunctive are clear pupils equal round react to light extraocular muscles intact    Ears-no deformity-no drainage  Nares-no nasal deformity-no discharge-turbinates not significantly enlarged  Throat-oral mucosa moist, no erythema or exudate  Heart-regular rate and rhythm no rubs gallops clicks or murmurs. No JVD grossly  Lungs-clear auscultation palpation and percussion, symmetric excursion of the chest wall. No wheezing    Abdomen-tender mid epigastrium and bilateral upper quadrants, no gross increased distention. Bowel sounds present all 4 quadrants. Extremities-no significant edema, moves all extremities symmetrically. Skin-no obvious breakdown or significant rash  Neurologic-cranial nerves II through XII grossly intact, no focal motor deficits grossly. No tremor    Psychiatric-no suicidal ideations or homicidal ideations. Denies depressed thoughts.     I have personally reviewed labs and tests showing:  Recent Labs:  Recent Results (from the past 48 hour(s))   EKG 12 Lead (Select if Upper Abd Pain, or SOB, Diaphoresis or Tachy)    Collection Time: 12/02/22  8:33 PM   Result Value Ref Range    Ventricular Rate 73 BPM    Atrial Rate 73 BPM    P-R Interval 132 ms    QRS Duration 90 ms    Q-T Interval 390 ms    QTc Calculation (Bazett) 429 ms    P Axis 22 degrees    R Axis 64 degrees    T Axis 44 degrees    Diagnosis Normal sinus rhythm    CBC with Diff    Collection Time: 12/02/22  8:44 PM   Result Value Ref Range    WBC 8.7 4.3 - 11.1 K/uL    RBC 4.73 4.23 - 5.6 M/uL    Hemoglobin 15.9 13.6 - 17.2 g/dL    Hematocrit 45.6 41.1 - 50.3 %    MCV 96.4 82 - 102 FL    MCH 33.6 (H) 26.1 - 32.9 PG    MCHC 34.9 31.4 - 35.0 g/dL    RDW 11.6 (L) 11.9 - 14.6 %    Platelets 935 733 - 774 K/uL    MPV 9.6 9.4 - 12.3 FL    nRBC 0.00 0.0 - 0.2 K/uL    Differential Type AUTOMATED      Seg Neutrophils 76 43 - 78 %    Lymphocytes 16 13 - 44 %    Monocytes 7 4.0 - 12.0 %    Eosinophils % 1 0.5 - 7.8 %    Basophils 0 0.0 - 2.0 %    Immature Granulocytes 0 0.0 - 5.0 %    Segs Absolute 6.6 1.7 - 8.2 K/UL    Absolute Lymph # 1.4 0.5 - 4.6 K/UL    Absolute Mono # 0.6 0.1 - 1.3 K/UL    Absolute Eos # 0.0 0.0 - 0.8 K/UL    Basophils Absolute 0.0 0.0 - 0.2 K/UL    Absolute Immature Granulocyte 0.0 0.0 - 0.5 K/UL   CMP    Collection Time: 12/02/22  8:44 PM   Result Value Ref Range    Sodium 134 133 - 143 mmol/L    Potassium 3.3 (L) 3.5 - 5.1 mmol/L    Chloride 100 (L) 101 - 110 mmol/L    CO2 27 21 - 32 mmol/L    Anion Gap 7 2 - 11 mmol/L    Glucose 103 (H) 65 - 100 mg/dL    BUN 11 6 - 23 MG/DL    Creatinine 1.00 0.8 - 1.5 MG/DL    Est, Glom Filt Rate >60 >60 ml/min/1.73m2    Calcium 9.0 8.3 - 10.4 MG/DL    Total Bilirubin 0.7 0.2 - 1.1 MG/DL    ALT 55 12 - 65 U/L    AST 38 (H) 15 - 37 U/L    Alk Phosphatase 83 50 - 136 U/L    Total Protein 7.8 6.3 - 8.2 g/dL    Albumin 4.2 3.5 - 5.0 g/dL    Globulin 3.6 2.8 - 4.5 g/dL    Albumin/Globulin Ratio 1.2 0.4 - 1.6     Lipase    Collection Time: 12/02/22  8:44 PM   Result Value Ref Range    Lipase 989 (H) 73 - 393 U/L   Lactate Dehydrogenase    Collection Time: 12/02/22  8:44 PM   Result Value Ref Range     (H) 100 - 190 U/L   C-Reactive Protein    Collection Time: 12/02/22  8:44 PM   Result Value Ref Range    CRP <0.3 0.0 - 0.9 mg/dL   POCT Urinalysis no Micro    Collection Time: 12/02/22  9:32 PM   Result Value Ref Range    Specific Gravity, Urine, POC >1.030 (H) 1.001 - 1.023    pH, Urine, POC 6.5 5.0 - 9.0      Protein, Urine, POC 30 (A) NEG mg/dL    Glucose, UA POC Negative NEG mg/dL    Ketones, Urine, POC 15 (A) NEG mg/dL    Bilirubin, Urine, POC SMALL (A) NEG      Blood, UA POC Trace Intact (A) NEG      URINE UROBILINOGEN POC 0.2 0.2 - 1.0 EU/dL    Nitrate, Urine, POC Negative NEG      Leukocyte Est, UA POC Negative NEG      Performed by: Alissa Lugo    Lactic Acid    Collection Time: 12/02/22 11:30 PM   Result Value Ref Range    Lactic Acid, Plasma 0.4 0.4 - 2.0 MMOL/L   Comprehensive Metabolic Panel w/ Reflex to MG    Collection Time: 12/03/22  6:00 AM   Result Value Ref Range    Sodium 135 133 - 143 mmol/L    Potassium 4.1 3.5 - 5.1 mmol/L    Chloride 104 101 - 110 mmol/L    CO2 26 21 - 32 mmol/L    Anion Gap 5 2 - 11 mmol/L    Glucose 158 (H) 65 - 100 mg/dL    BUN 9 6 - 23 MG/DL    Creatinine 0.90 0.8 - 1.5 MG/DL    Est, Glom Filt Rate >60 >60 ml/min/1.73m2    Calcium 7.9 (L) 8.3 - 10.4 MG/DL    Total Bilirubin 0.7 0.2 - 1.1 MG/DL    ALT 42 12 - 65 U/L    AST 29 15 - 37 U/L    Alk Phosphatase 69 50 - 136 U/L    Total Protein 6.6 6.3 - 8.2 g/dL    Albumin 3.5 3.5 - 5.0 g/dL    Globulin 3.1 2.8 - 4.5 g/dL    Albumin/Globulin Ratio 1.1 0.4 - 1.6     Lipid Panel    Collection Time: 12/03/22  8:28 AM   Result Value Ref Range    Cholesterol, Total 204 (H) <200 MG/DL    Triglycerides 138 35 - 150 MG/DL    HDL 87 (H) 40 - 60 MG/DL    LDL Calculated 89.4 <100 MG/DL    VLDL Cholesterol Calculated 27.6 (H) 6.0 - 23.0 MG/DL    Chol/HDL Ratio 2.3     POCT Glucose    Collection Time: 12/03/22  8:56 AM   Result Value Ref Range    POC Glucose 149 (H) 65 - 100 mg/dL    Performed by: [facility]    Comprehensive Metabolic Panel w/ Reflex to MG    Collection Time: 12/04/22  5:07 AM   Result Value Ref Range    Sodium 141 133 - 143 mmol/L    Potassium 3.4 (L) 3.5 - 5.1 mmol/L    Chloride 108 101 - 110 mmol/L    CO2 28 21 - 32 mmol/L    Anion Gap 5 2 - 11 mmol/L    Glucose 109 (H) 65 - 100 mg/dL    BUN 5 (L) 6 - 23 MG/DL    Creatinine 0.70 (L) 0.8 - 1.5 MG/DL    Est, Glom Filt Rate >60 >60 ml/min/1.73m2    Calcium 8.2 (L) 8.3 - 10.4 MG/DL    Total Bilirubin 0.8 0.2 - 1.1 MG/DL    ALT 32 12 - 65 U/L    AST 19 15 - 37 U/L    Alk Phosphatase 63 50 - 136 U/L    Total Protein 6.0 (L) 6.3 - 8.2 g/dL    Albumin 3.2 (L) 3.5 - 5.0 g/dL    Globulin 2.8 2.8 - 4.5 g/dL    Albumin/Globulin Ratio 1.1 0.4 - 1.6     CBC with Auto Differential    Collection Time: 12/04/22  5:07 AM   Result Value Ref Range    WBC 6.1 4.3 - 11.1 K/uL    RBC 3.93 (L) 4.23 - 5.6 M/uL    Hemoglobin 13.4 (L) 13.6 - 17.2 g/dL    Hematocrit 39.3 (L) 41.1 - 50.3 %    .0 82 - 102 FL    MCH 34.1 (H) 26.1 - 32.9 PG    MCHC 34.1 31.4 - 35.0 g/dL    RDW 11.5 (L) 11.9 - 14.6 %    Platelets 222 747 - 114 K/uL    MPV 9.5 9.4 - 12.3 FL    nRBC 0.00 0.0 - 0.2 K/uL    Differential Type AUTOMATED      Seg Neutrophils 73 43 - 78 %    Lymphocytes 18 13 - 44 %    Monocytes 8 4.0 - 12.0 %    Eosinophils % 1 0.5 - 7.8 %    Basophils 0 0.0 - 2.0 %    Immature Granulocytes 0 0.0 - 5.0 %    Segs Absolute 4.4 1.7 - 8.2 K/UL    Absolute Lymph # 1.1 0.5 - 4.6 K/UL    Absolute Mono # 0.5 0.1 - 1.3 K/UL    Absolute Eos # 0.1 0.0 - 0.8 K/UL    Basophils Absolute 0.0 0.0 - 0.2 K/UL    Absolute Immature Granulocyte 0.0 0.0 - 0.5 K/UL   Lipase    Collection Time: 12/04/22  5:07 AM   Result Value Ref Range    Lipase 2,186 (H) 73 - 393 U/L   Magnesium    Collection Time: 12/04/22  5:07 AM   Result Value Ref Range    Magnesium 2.3 1.8 - 2.4 mg/dL       I have personally reviewed imaging studies showing: Other Studies:  CT ABDOMEN PELVIS WO CONTRAST Additional Contrast? None   Final Result      -Peripancreatic stranding adjacent to the pancreatic head, consistent with acute   pancreatitis. No discrete peripancreatic fluid collection.      -Reactive inflammation of the duodenum.                    Current Meds:  Current Facility-Administered Medications   Medication Dose Route Frequency    [START ON 12/5/2022] enoxaparin (LOVENOX) injection 40 mg  40 mg SubCUTAneous Q24H    citalopram (CELEXA) tablet 40 mg  40 mg Oral Daily    divalproex (DEPAKOTE ER) extended release tablet 500 mg  500 mg Oral Daily    sodium chloride flush 0.9 % injection 5-40 mL  5-40 mL IntraVENous 2 times per day    sodium chloride flush 0.9 % injection 5-40 mL  5-40 mL IntraVENous PRN    0.9 % sodium chloride infusion   IntraVENous PRN    ondansetron (ZOFRAN-ODT) disintegrating tablet 4 mg  4 mg Oral Q8H PRN    Or    ondansetron (ZOFRAN) injection 4 mg  4 mg IntraVENous Q6H PRN    polyethylene glycol (GLYCOLAX) packet 17 g  17 g Oral Daily PRN    acetaminophen (TYLENOL) tablet 650 mg  650 mg Oral Q6H PRN    Or    acetaminophen (TYLENOL) suppository 650 mg  650 mg Rectal Q6H PRN    lactated ringers infusion   IntraVENous Continuous    HYDROcodone-acetaminophen (NORCO) 5-325 MG per tablet 1 tablet  1 tablet Oral Q6H PRN    morphine injection 4 mg  4 mg IntraVENous Q4H PRN    LORazepam (ATIVAN) tablet 0.5 mg  0.5 mg Oral Q6H PRN    thiamine tablet 100 mg  100 mg Oral Daily    nicotine (NICODERM CQ) 21 MG/24HR 1 patch  1 patch TransDERmal Daily       Signed:  Christos Gamboa DO    Part of this note may have been written by using a voice dictation software. The note has been proof read but may still contain some grammatical/other typographical errors.

## 2022-12-04 NOTE — PROGRESS NOTES
Medical Student Note         Hospitalist Progress Note   Admit Date:  2022  8:32 PM   Name:  Bryan Tatum   Age:  30 y.o. Sex:  male  :  1985   MRN:  [id number]   Room:  [facility]/    Presenting Complaint: Abdominal Pain     Reason(s) for Admission: Acute pancreatitis, unspecified complication status, unspecified pancreatitis type SPECIALTY [facility] Course:   Copied from admission history and physical HPI:  Brayn Tatum is a 30 y.o. male who presented to ED with abdominal pain. Pain is located in mid-epigastrium. Started yesterday. Reports drinking alcohol 2 days ago. Does have h/o pancreatitis in the past and this feels similar. Associate vomiting. Upon ER evaluation, lipase is 989. CT A/P shows:  -Peripancreatic stranding adjacent to the pancreatic head, consistent with acute   pancreatitis. No discrete peripancreatic fluid collection.       -Reactive inflammation of the duodenum. Hospitalist to admit. Subjective & 24hr Events (22): Patient's abdominal pain began . Patient previously admitted to daily alcohol use with his last alcoholic drink being Friday morning . Patient had a previous episode of pancreatitis about a year ago. States this current episode is worse than the previous one. His lipase has risen from 989 on  to 2186 on . Describes his pain as 6-7/10 and not getting better or worse from the previous days. Denies current nausea or vomiting. Pain localizes to epigastric area but has mildly diffuse pain all over the abdomen. States he had back pain the day before but that has since dissipated. States he has pain in his abdomen when he takes a deep breath. Endorses passing more gas than normal but unable to produce a good bowel movement. No significant alcohol withdrawal symptoms seen during exam. Currently receiving IV lactated Ringer's and pain meds prn. Starting clear liquids as long as patient is stable.        Assessment & Plan: Principal Problem:    Acute pancreatitis, unspecified complication status, unspecified pancreatitis type  Plan: Start clear liquids. Aggressive lactated Ringer's IV hydration. PRN pain meds. Active Problems:    Dvt femoral (deep venous thrombosis) (HCC)  Plan: Continue home eliquis. Alcohol abuse  Plan: Thiamine daily. Ativan prn. Monitor for withdrawal symptoms. Chronic pain disorder  Plan: None at this time. Mood disorder ([facility] [address]  Plan: Possible discussion of alternative medication for bipolar disorder. Anticipated discharge needs:          Diet:  ADULT DIET; Clear Liquid  DVT PPx: Continue home eliquis. Code status: Full Code    Hospital Problems:  Principal Problem:    Acute pancreatitis, unspecified complication status, unspecified pancreatitis type  Active Problems:    Dvt femoral (deep venous thrombosis) (HCC)    Alcohol abuse    Chronic pain disorder    Mood disorder ([facility]  Resolved Problems:    * No resolved hospital problems. *      Objective:   Patient Vitals for the past 24 hrs:   Temp Pulse Resp BP SpO2   12/04/22 0748 98.3 °F (36.8 °C) 66 16 131/83 96 %   12/04/22 0440 -- -- 16 -- --   12/04/22 0410 -- -- 16 -- --   12/04/22 0400 97.4 °F (36.3 °C) 65 16 132/89 --   12/04/22 0028 -- -- 16 -- --   12/04/22 0000 97.5 °F (36.4 °C) 73 16 123/77 95 %   12/03/22 2358 -- -- 16 -- --   12/03/22 2015 97.7 °F (36.5 °C) 68 16 136/82 --   12/03/22 1941 97.3 °F (36.3 °C) 68 18 121/85 97 %   12/03/22 1916 -- -- 16 -- --   12/03/22 1531 98.1 °F (36.7 °C) 64 -- 126/83 93 %   12/03/22 1322 98.1 °F (36.7 °C) 67 17 (!) 145/90 95 %   12/03/22 1200 98 °F (36.7 °C) 70 18 114/64 --   12/03/22 1137 -- -- 18 -- --       Oxygen Therapy  SpO2: 96 %  O2 Device: None (Room air)    Estimated body mass index is 28.13 kg/m² as calculated from the following:    Height as of this encounter: 5' 8\" (1.727 m). Weight as of this encounter: 185 lb (83.9 kg).     Intake/Output Summary (Last 24 hours) at 12/4/2022 1136  Last data filed at 12/4/2022 0515  Gross per 24 hour   Intake --   Output 1100 ml   Net -1100 ml         Physical Exam:   Blood pressure 131/83, pulse 66, temperature 98.3 °F (36.8 °C), temperature source Oral, resp. rate 16, height 5' 8\" (1.727 m), weight 185 lb (83.9 kg), SpO2 96 %. General:    Well nourished. Head:  Normocephalic, atraumatic  Eyes:  Sclerae appear normal.  Pupils equally round. ENT:  Nares appear normal, no drainage. Moist oral mucosa  Neck:  No restricted ROM. Trachea midline   CV:   RRR. No m/r/g. No jugular venous distension. Lungs:   CTAB. No wheezing, rhonchi, or rales. Symmetric expansion. Abdomen: Bowel sounds present. Soft, nontender, nondistended. + pain with palpation in epigastric region and endorses diffuse tenderness across abdomen  Extremities: No cyanosis or clubbing. No edema  Skin:     No rashes and normal coloration. Warm and dry. Neuro:  CN II-XII grossly intact. Sensation intact. A&Ox3  Psych:  Normal mood and affect.       I have personally reviewed labs and tests showing:  Recent Labs:  Recent Results (from the past 48 hour(s))   EKG 12 Lead (Select if Upper Abd Pain, or SOB, Diaphoresis or Tachy)    Collection Time: 12/02/22  8:33 PM   Result Value Ref Range    Ventricular Rate 73 BPM    Atrial Rate 73 BPM    P-R Interval 132 ms    QRS Duration 90 ms    Q-T Interval 390 ms    QTc Calculation (Bazett) 429 ms    P Axis 22 degrees    R Axis 64 degrees    T Axis 44 degrees    Diagnosis Normal sinus rhythm    CBC with Diff    Collection Time: 12/02/22  8:44 PM   Result Value Ref Range    WBC 8.7 4.3 - 11.1 K/uL    RBC 4.73 4.23 - 5.6 M/uL    Hemoglobin 15.9 13.6 - 17.2 g/dL    Hematocrit 45.6 41.1 - 50.3 %    MCV 96.4 82 - 102 FL    MCH 33.6 (H) 26.1 - 32.9 PG    MCHC 34.9 31.4 - 35.0 g/dL    RDW 11.6 (L) 11.9 - 14.6 %    Platelets 595 218 - 747 K/uL    MPV 9.6 9.4 - 12.3 FL    nRBC 0.00 0.0 - 0.2 K/uL    Differential Type AUTOMATED      Seg Neutrophils 76 43 - 78 %    Lymphocytes 16 13 - 44 %    Monocytes 7 4.0 - 12.0 %    Eosinophils % 1 0.5 - 7.8 %    Basophils 0 0.0 - 2.0 %    Immature Granulocytes 0 0.0 - 5.0 %    Segs Absolute 6.6 1.7 - 8.2 K/UL    Absolute Lymph # 1.4 0.5 - 4.6 K/UL    Absolute Mono # 0.6 0.1 - 1.3 K/UL    Absolute Eos # 0.0 0.0 - 0.8 K/UL    Basophils Absolute 0.0 0.0 - 0.2 K/UL    Absolute Immature Granulocyte 0.0 0.0 - 0.5 K/UL   CMP    Collection Time: 12/02/22  8:44 PM   Result Value Ref Range    Sodium 134 133 - 143 mmol/L    Potassium 3.3 (L) 3.5 - 5.1 mmol/L    Chloride 100 (L) 101 - 110 mmol/L    CO2 27 21 - 32 mmol/L    Anion Gap 7 2 - 11 mmol/L    Glucose 103 (H) 65 - 100 mg/dL    BUN 11 6 - 23 MG/DL    Creatinine 1.00 0.8 - 1.5 MG/DL    Est, Glom Filt Rate >60 >60 ml/min/1.73m2    Calcium 9.0 8.3 - 10.4 MG/DL    Total Bilirubin 0.7 0.2 - 1.1 MG/DL    ALT 55 12 - 65 U/L    AST 38 (H) 15 - 37 U/L    Alk Phosphatase 83 50 - 136 U/L    Total Protein 7.8 6.3 - 8.2 g/dL    Albumin 4.2 3.5 - 5.0 g/dL    Globulin 3.6 2.8 - 4.5 g/dL    Albumin/Globulin Ratio 1.2 0.4 - 1.6     Lipase    Collection Time: 12/02/22  8:44 PM   Result Value Ref Range    Lipase 989 (H) 73 - 393 U/L   Lactate Dehydrogenase    Collection Time: 12/02/22  8:44 PM   Result Value Ref Range     (H) 100 - 190 U/L   C-Reactive Protein    Collection Time: 12/02/22  8:44 PM   Result Value Ref Range    CRP <0.3 0.0 - 0.9 mg/dL   POCT Urinalysis no Micro    Collection Time: 12/02/22  9:32 PM   Result Value Ref Range    Specific Gravity, Urine, POC >1.030 (H) 1.001 - 1.023    pH, Urine, POC 6.5 5.0 - 9.0      Protein, Urine, POC 30 (A) NEG mg/dL    Glucose, UA POC Negative NEG mg/dL    Ketones, Urine, POC 15 (A) NEG mg/dL    Bilirubin, Urine, POC SMALL (A) NEG      Blood, UA POC Trace Intact (A) NEG      URINE UROBILINOGEN POC 0.2 0.2 - 1.0 EU/dL    Nitrate, Urine, POC Negative NEG      Leukocyte Est, UA POC Negative NEG      Performed by: Ashanti Guerrier Ratio 1.1 0.4 - 1.6     CBC with Auto Differential    Collection Time: 12/04/22  5:07 AM   Result Value Ref Range    WBC 6.1 4.3 - 11.1 K/uL    RBC 3.93 (L) 4.23 - 5.6 M/uL    Hemoglobin 13.4 (L) 13.6 - 17.2 g/dL    Hematocrit 39.3 (L) 41.1 - 50.3 %    .0 82 - 102 FL    MCH 34.1 (H) 26.1 - 32.9 PG    MCHC 34.1 31.4 - 35.0 g/dL    RDW 11.5 (L) 11.9 - 14.6 %    Platelets 272 570 - 355 K/uL    MPV 9.5 9.4 - 12.3 FL    nRBC 0.00 0.0 - 0.2 K/uL    Differential Type AUTOMATED      Seg Neutrophils 73 43 - 78 %    Lymphocytes 18 13 - 44 %    Monocytes 8 4.0 - 12.0 %    Eosinophils % 1 0.5 - 7.8 %    Basophils 0 0.0 - 2.0 %    Immature Granulocytes 0 0.0 - 5.0 %    Segs Absolute 4.4 1.7 - 8.2 K/UL    Absolute Lymph # 1.1 0.5 - 4.6 K/UL    Absolute Mono # 0.5 0.1 - 1.3 K/UL    Absolute Eos # 0.1 0.0 - 0.8 K/UL    Basophils Absolute 0.0 0.0 - 0.2 K/UL    Absolute Immature Granulocyte 0.0 0.0 - 0.5 K/UL   Lipase    Collection Time: 12/04/22  5:07 AM   Result Value Ref Range    Lipase 2,186 (H) 73 - 393 U/L   Magnesium    Collection Time: 12/04/22  5:07 AM   Result Value Ref Range    Magnesium 2.3 1.8 - 2.4 mg/dL       I have personally reviewed imaging studies showing: Other Studies:  CT ABDOMEN PELVIS WO CONTRAST Additional Contrast? None   Final Result      -Peripancreatic stranding adjacent to the pancreatic head, consistent with acute   pancreatitis. No discrete peripancreatic fluid collection.      -Reactive inflammation of the duodenum.                    Current Meds:  Current Facility-Administered Medications   Medication Dose Route Frequency    potassium chloride (KLOR-CON M) extended release tablet 20 mEq  20 mEq Oral Q2H    [START ON 12/5/2022] enoxaparin (LOVENOX) injection 40 mg  40 mg SubCUTAneous Q24H    citalopram (CELEXA) tablet 40 mg  40 mg Oral Daily    divalproex (DEPAKOTE ER) extended release tablet 500 mg  500 mg Oral Daily    sodium chloride flush 0.9 % injection 5-40 mL  5-40 mL IntraVENous 2 times per day    sodium chloride flush 0.9 % injection 5-40 mL  5-40 mL IntraVENous PRN    0.9 % sodium chloride infusion   IntraVENous PRN    ondansetron (ZOFRAN-ODT) disintegrating tablet 4 mg  4 mg Oral Q8H PRN    Or    ondansetron (ZOFRAN) injection 4 mg  4 mg IntraVENous Q6H PRN    polyethylene glycol (GLYCOLAX) packet 17 g  17 g Oral Daily PRN    acetaminophen (TYLENOL) tablet 650 mg  650 mg Oral Q6H PRN    Or    acetaminophen (TYLENOL) suppository 650 mg  650 mg Rectal Q6H PRN    lactated ringers infusion   IntraVENous Continuous    HYDROcodone-acetaminophen (NORCO) 5-325 MG per tablet 1 tablet  1 tablet Oral Q6H PRN    morphine injection 4 mg  4 mg IntraVENous Q4H PRN    LORazepam (ATIVAN) tablet 0.5 mg  0.5 mg Oral Q6H PRN    thiamine tablet 100 mg  100 mg Oral Daily    nicotine (NICODERM CQ) 21 MG/24HR 1 patch  1 patch TransDERmal Daily       Signed:  Lesia Watson    Part of this note may have been written by using a voice dictation software. The note has been proof read but may still contain some grammatical/other typographical errors.

## 2022-12-05 VITALS
TEMPERATURE: 98.4 F | WEIGHT: 185 LBS | BODY MASS INDEX: 28.04 KG/M2 | HEIGHT: 68 IN | RESPIRATION RATE: 18 BRPM | DIASTOLIC BLOOD PRESSURE: 80 MMHG | SYSTOLIC BLOOD PRESSURE: 120 MMHG | HEART RATE: 64 BPM | OXYGEN SATURATION: 96 %

## 2022-12-05 LAB
ALBUMIN SERPL-MCNC: 3.2 G/DL (ref 3.5–5)
ALBUMIN/GLOB SERPL: 0.9 {RATIO} (ref 0.4–1.6)
ALP SERPL-CCNC: 188 U/L (ref 50–136)
ALT SERPL-CCNC: 176 U/L (ref 12–65)
ANION GAP SERPL CALC-SCNC: ABNORMAL MMOL/L (ref 2–11)
AST SERPL-CCNC: 334 U/L (ref 15–37)
BASOPHILS # BLD: 0 K/UL (ref 0–0.2)
BASOPHILS NFR BLD: 1 % (ref 0–2)
BILIRUB SERPL-MCNC: 0.8 MG/DL (ref 0.2–1.1)
BUN SERPL-MCNC: 4 MG/DL (ref 6–23)
CALCIUM SERPL-MCNC: 8.7 MG/DL (ref 8.3–10.4)
CHLORIDE SERPL-SCNC: 107 MMOL/L (ref 101–110)
CO2 SERPL-SCNC: 31 MMOL/L (ref 21–32)
CREAT SERPL-MCNC: 0.7 MG/DL (ref 0.8–1.5)
DIFFERENTIAL METHOD BLD: ABNORMAL
EOSINOPHIL # BLD: 0.1 K/UL (ref 0–0.8)
EOSINOPHIL NFR BLD: 3 % (ref 0.5–7.8)
ERYTHROCYTE [DISTWIDTH] IN BLOOD BY AUTOMATED COUNT: 11.5 % (ref 11.9–14.6)
GLOBULIN SER CALC-MCNC: 3.5 G/DL (ref 2.8–4.5)
GLUCOSE SERPL-MCNC: 85 MG/DL (ref 65–100)
HCT VFR BLD AUTO: 39.7 % (ref 41.1–50.3)
HGB BLD-MCNC: 13.3 G/DL (ref 13.6–17.2)
IGG4 SER-MCNC: 55 MG/DL (ref 2–96)
IMM GRANULOCYTES # BLD AUTO: 0 K/UL (ref 0–0.5)
IMM GRANULOCYTES NFR BLD AUTO: 0 % (ref 0–5)
LIPASE SERPL-CCNC: 737 U/L (ref 73–393)
LYMPHOCYTES # BLD: 1.1 K/UL (ref 0.5–4.6)
LYMPHOCYTES NFR BLD: 22 % (ref 13–44)
MAGNESIUM SERPL-MCNC: 2.3 MG/DL (ref 1.8–2.4)
MCH RBC QN AUTO: 33.4 PG (ref 26.1–32.9)
MCHC RBC AUTO-ENTMCNC: 33.5 G/DL (ref 31.4–35)
MCV RBC AUTO: 99.7 FL (ref 82–102)
MONOCYTES # BLD: 0.5 K/UL (ref 0.1–1.3)
MONOCYTES NFR BLD: 10 % (ref 4–12)
NEUTS SEG # BLD: 3.4 K/UL (ref 1.7–8.2)
NEUTS SEG NFR BLD: 64 % (ref 43–78)
NRBC # BLD: 0 K/UL (ref 0–0.2)
PLATELET # BLD AUTO: 184 K/UL (ref 150–450)
PMV BLD AUTO: 9.4 FL (ref 9.4–12.3)
POTASSIUM SERPL-SCNC: 3.8 MMOL/L (ref 3.5–5.1)
PROT SERPL-MCNC: 6.7 G/DL (ref 6.3–8.2)
RBC # BLD AUTO: 3.98 M/UL (ref 4.23–5.6)
SODIUM SERPL-SCNC: 137 MMOL/L (ref 133–143)
WBC # BLD AUTO: 5.2 K/UL (ref 4.3–11.1)

## 2022-12-05 PROCEDURE — 2580000003 HC RX 258: Performed by: FAMILY MEDICINE

## 2022-12-05 PROCEDURE — 6370000000 HC RX 637 (ALT 250 FOR IP): Performed by: INTERNAL MEDICINE

## 2022-12-05 PROCEDURE — 6360000002 HC RX W HCPCS: Performed by: INTERNAL MEDICINE

## 2022-12-05 PROCEDURE — 85025 COMPLETE CBC W/AUTO DIFF WBC: CPT

## 2022-12-05 PROCEDURE — 83690 ASSAY OF LIPASE: CPT

## 2022-12-05 PROCEDURE — 36415 COLL VENOUS BLD VENIPUNCTURE: CPT

## 2022-12-05 PROCEDURE — 6370000000 HC RX 637 (ALT 250 FOR IP): Performed by: FAMILY MEDICINE

## 2022-12-05 PROCEDURE — 83735 ASSAY OF MAGNESIUM: CPT

## 2022-12-05 PROCEDURE — 80053 COMPREHEN METABOLIC PANEL: CPT

## 2022-12-05 RX ORDER — OXYCODONE HYDROCHLORIDE 5 MG/1
10 TABLET ORAL EVERY 4 HOURS PRN
Status: DISCONTINUED | OUTPATIENT
Start: 2022-12-05 | End: 2022-12-05 | Stop reason: HOSPADM

## 2022-12-05 RX ORDER — LANOLIN ALCOHOL/MO/W.PET/CERES
100 CREAM (GRAM) TOPICAL DAILY
Qty: 30 TABLET | Refills: 0 | Status: SHIPPED | OUTPATIENT
Start: 2022-12-06

## 2022-12-05 RX ORDER — OXYCODONE HYDROCHLORIDE 10 MG/1
5 TABLET ORAL EVERY 6 HOURS PRN
Qty: 12 TABLET | Refills: 0 | Status: SHIPPED | OUTPATIENT
Start: 2022-12-05 | End: 2022-12-08

## 2022-12-05 RX ADMIN — MORPHINE SULFATE 4 MG: 2 INJECTION, SOLUTION INTRAMUSCULAR; INTRAVENOUS at 00:02

## 2022-12-05 RX ADMIN — MORPHINE SULFATE 4 MG: 2 INJECTION, SOLUTION INTRAMUSCULAR; INTRAVENOUS at 08:19

## 2022-12-05 RX ADMIN — CITALOPRAM HYDROBROMIDE 40 MG: 20 TABLET ORAL at 08:19

## 2022-12-05 RX ADMIN — OXYCODONE 10 MG: 5 TABLET ORAL at 14:37

## 2022-12-05 RX ADMIN — SODIUM CHLORIDE, PRESERVATIVE FREE 10 ML: 5 INJECTION INTRAVENOUS at 08:25

## 2022-12-05 RX ADMIN — Medication 100 MG: at 08:19

## 2022-12-05 RX ADMIN — DIVALPROEX SODIUM 500 MG: 500 TABLET, EXTENDED RELEASE ORAL at 08:19

## 2022-12-05 RX ADMIN — ENOXAPARIN SODIUM 40 MG: 100 INJECTION SUBCUTANEOUS at 08:19

## 2022-12-05 ASSESSMENT — PAIN DESCRIPTION - LOCATION
LOCATION: ABDOMEN

## 2022-12-05 ASSESSMENT — PAIN DESCRIPTION - ORIENTATION
ORIENTATION: MID
ORIENTATION: MID
ORIENTATION: MID;LOWER

## 2022-12-05 ASSESSMENT — PAIN SCALES - GENERAL
PAINLEVEL_OUTOF10: 2
PAINLEVEL_OUTOF10: 9
PAINLEVEL_OUTOF10: 0
PAINLEVEL_OUTOF10: 5
PAINLEVEL_OUTOF10: 7
PAINLEVEL_OUTOF10: 3

## 2022-12-05 ASSESSMENT — PAIN DESCRIPTION - DESCRIPTORS
DESCRIPTORS: CRAMPING
DESCRIPTORS: ACHING
DESCRIPTORS: ACHING;CRAMPING
DESCRIPTORS: CRAMPING

## 2022-12-05 ASSESSMENT — PAIN DESCRIPTION - ONSET
ONSET: GRADUAL
ONSET: GRADUAL

## 2022-12-05 ASSESSMENT — PAIN DESCRIPTION - FREQUENCY
FREQUENCY: INTERMITTENT
FREQUENCY: INTERMITTENT

## 2022-12-05 ASSESSMENT — PAIN - FUNCTIONAL ASSESSMENT
PAIN_FUNCTIONAL_ASSESSMENT: ACTIVITIES ARE NOT PREVENTED

## 2022-12-05 ASSESSMENT — PAIN DESCRIPTION - PAIN TYPE: TYPE: ACUTE PAIN

## 2022-12-05 NOTE — PLAN OF CARE
Problem: Discharge Planning  Goal: Discharge to home or other facility with appropriate resources  Outcome: Progressing  Flowsheets (Taken 12/4/2022 1948)  Discharge to home or other facility with appropriate resources: Identify barriers to discharge with patient and caregiver     Problem: Pain  Goal: Verbalizes/displays adequate comfort level or baseline comfort level  Outcome: Progressing  Flowsheets (Taken 12/4/2022 1948)  Verbalizes/displays adequate comfort level or baseline comfort level:   Encourage patient to monitor pain and request assistance   Assess pain using appropriate pain scale     Problem: Safety - Adult  Goal: Free from fall injury  Outcome: Progressing

## 2022-12-05 NOTE — DISCHARGE SUMMARY
Hospitalist Discharge Summary   Admit Date:  2022  8:32 PM   DC Note date: 2022  Name:  Derrick Valdez   Age:  30 y.o. Sex:  male  :  1985   MRN:  [id number]   Room:  [facility]  PCP:  PROVIDER UNKNOWN, MD    Presenting Complaint: Abdominal Pain     Initial Admission Diagnosis: Acute pancreatitis, unspecified complication status, unspecified pancreatitis type [K85.90]     Problem List for this Hospitalization (present on admission):    Principal Problem:    Acute pancreatitis, unspecified complication status, unspecified pancreatitis type  Active Problems:    Dvt femoral (deep venous thrombosis) (HCC)    Alcohol abuse    Chronic pain disorder    Mood disorder ([address]  Resolved Problems:    * No resolved hospital problems. [facility] Course:  74-year-old male with daily alcohol abuse and a past history of pancreatitis related to alcohol use, presented to the hospital with acute pancreatitis. Initial lipase 989 with CT evidence of peripancreatic stranding adjacent to pancreatic head consistent with acute pancreatitis. Lipase less than 1000 on admission but increased only to decrease today to the 700s-pain is improved and he has tolerated advance diet. He wants to be discharged home. It is interesting his liver transaminases were not significantly elevated despite his daily alcohol use for the first 48 hours but have increased today without elevation of bilirubin and recommend follow-up on outpatient follow-up with primary care. We also did discuss continue Depakote use for now but discussed with primary care regarding this is a category 1A medication in risk category for drugs causing acute pancreatitis. He will be given a 3-day prescription for 5 mg oxycodone up to 4 times daily as needed and encouraged not to use unless absolutely necessary and discard if not using-past history of chronic pain syndrome.   His pancreatitis appears to be resolving and should continue to improve but he is to return if worse. He appears motivated for alcohol cessation and wishes to pursue all available assistance through the veterans administration on follow-up. He is to follow-up in 1 week and sooner as needed return if worse or symptoms recur             Disposition: Appears stable for discharge  Diet: ADULT DIET; Regular; 3 carb choices (45 gm/meal); Low Fat/Low Chol/High Fiber/JOEL  Code Status: Full Code    Follow Ups:  Follow-up at [address] [facility] within 1 week    Time spent in patient discharge and coordination 42 minutes minutes. Follow up labs/diagnostics (ultimately defer to outpatient provider): Comprehensive metabolic panel    Plan was discussed with patient, spouse and staff. All questions answered. Patient was stable at time of discharge. Instructions given to call a physician or return if any concerns. Current Discharge Medication List        START taking these medications    Details   oxyCODONE (OXY-IR) 10 MG immediate release tablet Take 0.5 tablets by mouth every 6 hours as needed for Pain for up to 3 days.   Qty: 12 tablet, Refills: 0    Comments: Reduce doses taken as pain becomes manageable  Associated Diagnoses: Acute pancreatitis, unspecified complication status, unspecified pancreatitis type      thiamine 100 MG tablet Take 1 tablet by mouth daily  Qty: 30 tablet, Refills: 0           CONTINUE these medications which have NOT CHANGED    Details   pantoprazole (PROTONIX) 20 MG tablet Take 20 mg by mouth every 12 hours      Melatonin 10 MG TABS Take by mouth      gabapentin (NEURONTIN) 300 MG capsule TAKE 1 CAPSULE BY MOUTH THREE TIMES DAILY FOR 7 DAYS  Qty: 21 capsule, Refills: 0      ondansetron (ZOFRAN) 4 MG tablet Take 1 tablet by mouth every 4-6 hours as needed for Nausea or Vomiting  Qty: 20 tablet, Refills: 0      citalopram (CELEXA) 40 MG tablet Take 40 mg by mouth daily       divalproex (DEPAKOTE ER) 500 MG extended release tablet Take 500 mg by mouth      acetaminophen (TYLENOL) 500 MG tablet Take 500 mg by mouth every 6 hours as needed for Pain           STOP taking these medications       ELIQUIS 5 MG TABS tablet Comments:   Reason for Stopping:               Procedures done this admission:  * No surgery found *    Consults this admission:  None    Echocardiogram results:  No results found for this or any previous visit. Diagnostic Imaging/Tests:   CT ABDOMEN PELVIS WO CONTRAST Additional Contrast? None    Result Date: 12/2/2022  -Peripancreatic stranding adjacent to the pancreatic head, consistent with acute pancreatitis. No discrete peripancreatic fluid collection. -Reactive inflammation of the duodenum.         Labs: Results:       BMP, Mg, Phos Recent Labs     12/03/22 0600 12/04/22  0507 12/05/22  0517    141 137   K 4.1 3.4* 3.8    108 107   CO2 26 28 31   ANIONGAP 5 5 Cannot be calculated   BUN 9 5* 4*   CREATININE 0.90 0.70* 0.70*   LABGLOM >60 >60 >60   CALCIUM 7.9* 8.2* 8.7   GLUCOSE 158* 109* 85   MG  --  2.3 2.3      CBC Recent Labs     12/02/22 2044 12/04/22 0507 12/05/22  0517   WBC 8.7 6.1 5.2   RBC 4.73 3.93* 3.98*   HGB 15.9 13.4* 13.3*   HCT 45.6 39.3* 39.7*   MCV 96.4 100.0 99.7   MCH 33.6* 34.1* 33.4*   MCHC 34.9 34.1 33.5   RDW 11.6* 11.5* 11.5*    169 184   MPV 9.6 9.5 9.4   NRBC 0.00 0.00 0.00   SEGS 76 73 64   LYMPHOPCT 16 18 22   EOSRELPCT 1 1 3   MONOPCT 7 8 10   BASOPCT 0 0 1   IMMGRAN 0 0 0   SEGSABS 6.6 4.4 3.4   LYMPHSABS 1.4 1.1 1.1   EOSABS 0.0 0.1 0.1   MONOSABS 0.6 0.5 0.5   BASOSABS 0.0 0.0 0.0   ABSIMMGRAN 0.0 0.0 0.0      LFT Recent Labs     12/03/22 0600 12/04/22  0507 12/05/22  0517   BILITOT 0.7 0.8 0.8   ALKPHOS 69 63 188*   AST 29 19 334*   ALT 42 32 176*   PROT 6.6 6.0* 6.7   LABALBU 3.5 3.2* 3.2*   GLOB 3.1 2.8 3.5      Cardiac  No results found for: NTPROBNP, TROPHS   Coags No results found for: PROTIME, INR, APTT   A1c No results found for: LABA1C, EAG   Lipids Lab Results   Component Value Date/Time    CHOL 204 12/03/2022 08:28 AM    LDLCALC 89.4 12/03/2022 08:28 AM    LABVLDL 27.6 12/03/2022 08:28 AM    HDL 87 12/03/2022 08:28 AM    CHOLHDLRATIO 2.3 12/03/2022 08:28 AM    TRIG 138 12/03/2022 08:28 AM      Thyroid  No results found for: Main Guzman     Most Recent UA Lab Results   Component Value Date/Time    GLUCOSEU Negative 12/02/2022 09:32 PM    BLOODU Trace Intact 12/02/2022 09:32 PM        No results for input(s): CULTURE in the last 720 hours.     All Labs from Last 24 Hrs:  Recent Results (from the past 24 hour(s))   Comprehensive Metabolic Panel w/ Reflex to MG    Collection Time: 12/05/22  5:17 AM   Result Value Ref Range    Sodium 137 133 - 143 mmol/L    Potassium 3.8 3.5 - 5.1 mmol/L    Chloride 107 101 - 110 mmol/L    CO2 31 21 - 32 mmol/L    Anion Gap Cannot be calculated 2 - 11 mmol/L    Glucose 85 65 - 100 mg/dL    BUN 4 (L) 6 - 23 MG/DL    Creatinine 0.70 (L) 0.8 - 1.5 MG/DL    Est, Glom Filt Rate >60 >60 ml/min/1.73m2    Calcium 8.7 8.3 - 10.4 MG/DL    Total Bilirubin 0.8 0.2 - 1.1 MG/DL     (H) 12 - 65 U/L     (H) 15 - 37 U/L    Alk Phosphatase 188 (H) 50 - 136 U/L    Total Protein 6.7 6.3 - 8.2 g/dL    Albumin 3.2 (L) 3.5 - 5.0 g/dL    Globulin 3.5 2.8 - 4.5 g/dL    Albumin/Globulin Ratio 0.9 0.4 - 1.6     CBC with Auto Differential    Collection Time: 12/05/22  5:17 AM   Result Value Ref Range    WBC 5.2 4.3 - 11.1 K/uL    RBC 3.98 (L) 4.23 - 5.6 M/uL    Hemoglobin 13.3 (L) 13.6 - 17.2 g/dL    Hematocrit 39.7 (L) 41.1 - 50.3 %    MCV 99.7 82 - 102 FL    MCH 33.4 (H) 26.1 - 32.9 PG    MCHC 33.5 31.4 - 35.0 g/dL    RDW 11.5 (L) 11.9 - 14.6 %    Platelets 318 505 - 361 K/uL    MPV 9.4 9.4 - 12.3 FL    nRBC 0.00 0.0 - 0.2 K/uL    Differential Type AUTOMATED      Seg Neutrophils 64 43 - 78 %    Lymphocytes 22 13 - 44 %    Monocytes 10 4.0 - 12.0 %    Eosinophils % 3 0.5 - 7.8 %    Basophils 1 0.0 - 2.0 %    Immature Granulocytes 0 0.0 - 5.0 %    Segs Absolute 3.4 1.7 - 8.2 K/UL    Absolute Lymph # 1.1 0.5 - 4.6 K/UL    Absolute Mono # 0.5 0.1 - 1.3 K/UL    Absolute Eos # 0.1 0.0 - 0.8 K/UL    Basophils Absolute 0.0 0.0 - 0.2 K/UL    Absolute Immature Granulocyte 0.0 0.0 - 0.5 K/UL   Magnesium    Collection Time: 12/05/22  5:17 AM   Result Value Ref Range    Magnesium 2.3 1.8 - 2.4 mg/dL   Lipase    Collection Time: 12/05/22  5:17 AM   Result Value Ref Range    Lipase 737 (H) 73 - 393 U/L       Allergies   Allergen Reactions    Contrast [Iodides] Hives, Swelling and Rash    Acetaminophen Nausea Only     Patient denies allergy: takes acetaminophen daily    Adhesive Tape Itching    Ibuprofen Other (See Comments)     GI bleeding      Other Nausea Only     Opioid pain meds - all of them cause nausea stephanie IV        There is no immunization history on file for this patient. Recent Vital Data:  Patient Vitals for the past 24 hrs:   Temp Pulse Resp BP SpO2   12/05/22 1118 98.4 °F (36.9 °C) 64 19 120/80 96 %   12/05/22 0717 98.6 °F (37 °C) 59 18 118/82 98 %   12/05/22 0509 98.3 °F (36.8 °C) 76 18 111/81 94 %   12/04/22 2340 98.6 °F (37 °C) 79 16 (!) 145/98 95 %   12/04/22 2012 98.6 °F (37 °C) 64 18 124/88 94 %   12/04/22 1458 98.6 °F (37 °C) 68 16 133/83 95 %       Oxygen Therapy  SpO2: 96 %  O2 Device: None (Room air)    Estimated body mass index is 28.13 kg/m² as calculated from the following:    Height as of this encounter: 5' 8\" (1.727 m). Weight as of this encounter: 185 lb (83.9 kg). Intake/Output Summary (Last 24 hours) at 12/5/2022 1335  Last data filed at 12/5/2022 1256  Gross per 24 hour   Intake 480 ml   Output --   Net 480 ml         Physical Exam:    General:    Well nourished. No overt distress  Head:  Normocephalic, atraumatic  Eyes:  Sclerae appear normal.  Pupils equally round. HENT:  Nares appear normal, no drainage. Moist mucous membranes  Neck:  No restricted ROM. Trachea midline  CV:   RRR. No m/r/g. No JVD  Lungs:   CTAB. No wheezing, rhonchi, or rales.   Respirations even, unlabored  Abdomen:   Soft, much less tender, bowel sounds in all 4 quadrants  Extremities: Warm and dry. No cyanosis or clubbing. No edema. Skin:     No rashes. Normal coloration  Neuro:  CN II-XII grossly intact. Psych:  Normal mood and affect. Signed:  Efren Duty, DO    Part of this note may have been written by using a voice dictation software. The note has been proof read but may still contain some grammatical/other typographical errors.

## 2022-12-05 NOTE — CARE COORDINATION
CM assessment completed; see below. No CM needs noted at discharge. Noted that patient has [employer] benefits listed. Upon review of chart, no mention of [employer] being notified of patient admissions, which is to be completed within 72 hours of admissions. This CM completed notification online with reference number; [id number].         12/05/22 1400   Service Assessment   Patient Orientation Alert and Oriented   Cognition Alert   History Provided By Medical Record   Primary Caregiver Self   Accompanied By/Relationship N/A   Support Systems Friends/Neighbors   Patient's Healthcare Decision Maker is: Legal Next of Kin   PCP Verified by CM No   Prior Functional Level Independent in ADLs/IADLs   Current Functional Level Independent in ADLs/IADLs   Can patient return to prior living arrangement Yes   Ability to make needs known: Good   Family able to assist with home care needs: Yes   Would you like for me to discuss the discharge plan with any other family members/significant others, and if so, who?  No   [employer] ([place]   [employer] Other (Comment)  (N/A)

## 2022-12-05 NOTE — PLAN OF CARE
Problem: Discharge Planning  Goal: Discharge to home or other facility with appropriate resources  12/5/2022 1058 by Burak Hodge RN  Outcome: Progressing  Flowsheets (Taken 12/5/2022 9668)  Discharge to home or other facility with appropriate resources: Identify barriers to discharge with patient and caregiver  12/4/2022 2149 by Mira Elizalde RN  Outcome: Progressing  Flowsheets (Taken 12/4/2022 1948)  Discharge to home or other facility with appropriate resources: Identify barriers to discharge with patient and caregiver     Problem: Pain  Goal: Verbalizes/displays adequate comfort level or baseline comfort level  12/5/2022 1058 by Burak Hodge RN  Outcome: Progressing  Flowsheets (Taken 12/5/2022 0900)  Verbalizes/displays adequate comfort level or baseline comfort level: Encourage patient to monitor pain and request assistance  12/4/2022 2149 by Mira Elizalde RN  Outcome: Progressing  Flowsheets (Taken 12/4/2022 1948)  Verbalizes/displays adequate comfort level or baseline comfort level:   Encourage patient to monitor pain and request assistance   Assess pain using appropriate pain scale     Problem: Safety - Adult  Goal: Free from fall injury  12/5/2022 1058 by Burak Hodge RN  Outcome: Progressing  12/4/2022 2149 by Mira Elizalde RN  Outcome: Progressing

## 2022-12-05 NOTE — PROGRESS NOTES
Medical Student Note         Hospitalist Progress Note   Admit Date:  2022  8:32 PM   Name:  Isiah Spain   Age:  30 y.o. Sex:  male  :  1985   MRN:  [id number]   Room:  [facility]/    Presenting Complaint: Abdominal Pain     Reason(s) for Admission: Acute pancreatitis, unspecified complication status, unspecified pancreatitis type SPECIALTY [facility] Course:   Copied from admission history and physical HPI:  Isiah Spain is a 30 y.o. male who presented to ED with abdominal pain. Pain is located in mid-epigastrium. Started yesterday. Reports drinking alcohol 2 days ago. Does have h/o pancreatitis in the past and this feels similar. Associate vomiting. Upon ER evaluation, lipase is 989. CT A/P shows:  -Peripancreatic stranding adjacent to the pancreatic head, consistent with acute   pancreatitis. No discrete peripancreatic fluid collection.       -Reactive inflammation of the duodenum. Hospitalist to admit. Subjective & 24hr Events (22): Reduction in pain this morning. Rates the pain as 4/10. Patient has been able to keep down clear liquids. He denies any nausea and vomiting within the last 24 hours. Continues to have mildly diffuse abdominal pain. Patient is still needing pain meds prn, but states that he feels much better compared to yesterday. Has been cleared to begin eating a regular diet. Has not had a bowel movement today. Continuing aggressive IV hydration. Patient's lipase has dropped from 2186 yesterday to 737 today. Patient's AST, ALT, and alk phosphatase levels have elevated between yesterday and today. Now taking Lovenox for DVT prophylaxis after finishing 3 months on Eliquis. Continuing on thiamine and ativan. Patient is alert and oriented x3. Subjective from  copied over for continuity of care  Patient's abdominal pain began . Patient previously admitted to daily alcohol use with his last alcoholic drink being Friday morning .  Patient had a previous episode of pancreatitis about a year ago. States this current episode is worse than the previous one. His lipase has risen from 989 on 12/2 to 2186 on 12/4. Describes his pain as 6-7/10 and not getting better or worse from the previous days. Denies current nausea or vomiting. Pain localizes to epigastric area but has mildly diffuse pain all over the abdomen. States he had back pain the day before but that has since dissipated. States he has pain in his abdomen when he takes a deep breath. Endorses passing more gas than normal but unable to produce a good bowel movement. No significant alcohol withdrawal symptoms seen during exam. Currently receiving IV lactated Ringer's and pain meds prn. Starting clear liquids as long as patient is stable. Review of Systems:   10 systems reviewed and negative except as noted in HPI. Assessment & Plan:     Principal Problem:    Acute pancreatitis, unspecified complication status, unspecified pancreatitis type  Plan: Begin on a regular diet. Continue IV analgesia and aggressive IV hydration. Monitor electrolytes. Active Problems:    Dvt femoral (deep venous thrombosis) ([facility])  Plan: Started on Lovenox. Alcohol abuse  Plan: Thiamine daily and Ativan prn. Monitor for any corresponding symptoms. Chronic pain disorder  Plan: Monitor. Mood disorder ([address]  Plan: Continuing to take Celexa and Depakote. Anticipated discharge needs:      Discharge to home. Diet:  ADULT DIET; Regular; 3 carb choices (45 gm/meal); Low Fat/Low Chol/High Fiber/JOEL  DVT PPx: Lovenox daily. Code status: Full Code    Hospital Problems:  Principal Problem:    Acute pancreatitis, unspecified complication status, unspecified pancreatitis type  Active Problems:    Dvt femoral (deep venous thrombosis) (HCC)    Alcohol abuse    Chronic pain disorder    Mood disorder ([facility] [address]  Resolved Problems:    * No resolved hospital problems.  *      Objective:   Patient Vitals for the past 24 hrs:   Temp Pulse Resp BP SpO2   12/05/22 0717 98.6 °F (37 °C) 59 18 118/82 98 %   12/05/22 0509 98.3 °F (36.8 °C) 76 18 111/81 94 %   12/04/22 2340 98.6 °F (37 °C) 79 16 (!) 145/98 95 %   12/04/22 2012 98.6 °F (37 °C) 64 18 124/88 94 %   12/04/22 1458 98.6 °F (37 °C) 68 16 133/83 95 %   12/04/22 1143 97.8 °F (36.6 °C) 74 16 (!) 130/92 96 %       Oxygen Therapy  SpO2: 98 %  O2 Device: None (Room air)    Estimated body mass index is 28.13 kg/m² as calculated from the following:    Height as of this encounter: 5' 8\" (1.727 m). Weight as of this encounter: 185 lb (83.9 kg). No intake or output data in the 24 hours ending 12/05/22 0905      Physical Exam:     Blood pressure 118/82, pulse 59, temperature 98.6 °F (37 °C), temperature source Oral, resp. rate 18, height 5' 8\" (1.727 m), weight 185 lb (83.9 kg), SpO2 98 %. General:    Well nourished. Head:  Normocephalic, atraumatic  Eyes:  Sclerae appear normal.  Pupils equally round. ENT:  Nares appear normal, no drainage. Moist oral mucosa  Neck:  No restricted ROM. Trachea midline   CV:   RRR. No m/r/g. No jugular venous distension. Lungs:   CTAB. No wheezing, rhonchi, or rales. Symmetric expansion. Abdomen: Bowel sounds present. Soft, nontender, nondistended. + diffusely tender abdomen  Extremities: No cyanosis or clubbing. No edema  Skin:     No rashes and normal coloration. Warm and dry. Neuro:  CN II-XII grossly intact. Sensation intact. A&Ox3  Psych:  Normal mood and affect.       I have personally reviewed labs and tests showing:  Recent Labs:  Recent Results (from the past 48 hour(s))   Comprehensive Metabolic Panel w/ Reflex to MG    Collection Time: 12/04/22  5:07 AM   Result Value Ref Range    Sodium 141 133 - 143 mmol/L    Potassium 3.4 (L) 3.5 - 5.1 mmol/L    Chloride 108 101 - 110 mmol/L    CO2 28 21 - 32 mmol/L    Anion Gap 5 2 - 11 mmol/L    Glucose 109 (H) 65 - 100 mg/dL    BUN 5 (L) 6 - 23 MG/DL    Creatinine 0.70 (L) 0.8 - 1.5 MG/DL    Est, Glom Filt Rate >60 >60 ml/min/1.73m2    Calcium 8.2 (L) 8.3 - 10.4 MG/DL    Total Bilirubin 0.8 0.2 - 1.1 MG/DL    ALT 32 12 - 65 U/L    AST 19 15 - 37 U/L    Alk Phosphatase 63 50 - 136 U/L    Total Protein 6.0 (L) 6.3 - 8.2 g/dL    Albumin 3.2 (L) 3.5 - 5.0 g/dL    Globulin 2.8 2.8 - 4.5 g/dL    Albumin/Globulin Ratio 1.1 0.4 - 1.6     CBC with Auto Differential    Collection Time: 12/04/22  5:07 AM   Result Value Ref Range    WBC 6.1 4.3 - 11.1 K/uL    RBC 3.93 (L) 4.23 - 5.6 M/uL    Hemoglobin 13.4 (L) 13.6 - 17.2 g/dL    Hematocrit 39.3 (L) 41.1 - 50.3 %    .0 82 - 102 FL    MCH 34.1 (H) 26.1 - 32.9 PG    MCHC 34.1 31.4 - 35.0 g/dL    RDW 11.5 (L) 11.9 - 14.6 %    Platelets 415 681 - 637 K/uL    MPV 9.5 9.4 - 12.3 FL    nRBC 0.00 0.0 - 0.2 K/uL    Differential Type AUTOMATED      Seg Neutrophils 73 43 - 78 %    Lymphocytes 18 13 - 44 %    Monocytes 8 4.0 - 12.0 %    Eosinophils % 1 0.5 - 7.8 %    Basophils 0 0.0 - 2.0 %    Immature Granulocytes 0 0.0 - 5.0 %    Segs Absolute 4.4 1.7 - 8.2 K/UL    Absolute Lymph # 1.1 0.5 - 4.6 K/UL    Absolute Mono # 0.5 0.1 - 1.3 K/UL    Absolute Eos # 0.1 0.0 - 0.8 K/UL    Basophils Absolute 0.0 0.0 - 0.2 K/UL    Absolute Immature Granulocyte 0.0 0.0 - 0.5 K/UL   Lipase    Collection Time: 12/04/22  5:07 AM   Result Value Ref Range    Lipase 2,186 (H) 73 - 393 U/L   Magnesium    Collection Time: 12/04/22  5:07 AM   Result Value Ref Range    Magnesium 2.3 1.8 - 2.4 mg/dL   Comprehensive Metabolic Panel w/ Reflex to MG    Collection Time: 12/05/22  5:17 AM   Result Value Ref Range    Sodium 137 133 - 143 mmol/L    Potassium 3.8 3.5 - 5.1 mmol/L    Chloride 107 101 - 110 mmol/L    CO2 31 21 - 32 mmol/L    Anion Gap Cannot be calculated 2 - 11 mmol/L    Glucose 85 65 - 100 mg/dL    BUN 4 (L) 6 - 23 MG/DL    Creatinine 0.70 (L) 0.8 - 1.5 MG/DL    Est, Glom Filt Rate >60 >60 ml/min/1.73m2    Calcium 8.7 8.3 - 10.4 MG/DL    Total Bilirubin 0.8 0.2 - 1.1 MG/DL  (H) 12 - 65 U/L     (H) 15 - 37 U/L    Alk Phosphatase 188 (H) 50 - 136 U/L    Total Protein 6.7 6.3 - 8.2 g/dL    Albumin 3.2 (L) 3.5 - 5.0 g/dL    Globulin 3.5 2.8 - 4.5 g/dL    Albumin/Globulin Ratio 0.9 0.4 - 1.6     CBC with Auto Differential    Collection Time: 12/05/22  5:17 AM   Result Value Ref Range    WBC 5.2 4.3 - 11.1 K/uL    RBC 3.98 (L) 4.23 - 5.6 M/uL    Hemoglobin 13.3 (L) 13.6 - 17.2 g/dL    Hematocrit 39.7 (L) 41.1 - 50.3 %    MCV 99.7 82 - 102 FL    MCH 33.4 (H) 26.1 - 32.9 PG    MCHC 33.5 31.4 - 35.0 g/dL    RDW 11.5 (L) 11.9 - 14.6 %    Platelets 678 097 - 601 K/uL    MPV 9.4 9.4 - 12.3 FL    nRBC 0.00 0.0 - 0.2 K/uL    Differential Type AUTOMATED      Seg Neutrophils 64 43 - 78 %    Lymphocytes 22 13 - 44 %    Monocytes 10 4.0 - 12.0 %    Eosinophils % 3 0.5 - 7.8 %    Basophils 1 0.0 - 2.0 %    Immature Granulocytes 0 0.0 - 5.0 %    Segs Absolute 3.4 1.7 - 8.2 K/UL    Absolute Lymph # 1.1 0.5 - 4.6 K/UL    Absolute Mono # 0.5 0.1 - 1.3 K/UL    Absolute Eos # 0.1 0.0 - 0.8 K/UL    Basophils Absolute 0.0 0.0 - 0.2 K/UL    Absolute Immature Granulocyte 0.0 0.0 - 0.5 K/UL   Magnesium    Collection Time: 12/05/22  5:17 AM   Result Value Ref Range    Magnesium 2.3 1.8 - 2.4 mg/dL   Lipase    Collection Time: 12/05/22  5:17 AM   Result Value Ref Range    Lipase 737 (H) 73 - 393 U/L       I have personally reviewed imaging studies showing: Other Studies:  CT ABDOMEN PELVIS WO CONTRAST Additional Contrast? None   Final Result      -Peripancreatic stranding adjacent to the pancreatic head, consistent with acute   pancreatitis. No discrete peripancreatic fluid collection.      -Reactive inflammation of the duodenum.                    Current Meds:  Current Facility-Administered Medications   Medication Dose Route Frequency    oxyCODONE (ROXICODONE) immediate release tablet 10 mg  10 mg Oral Q4H PRN    enoxaparin (LOVENOX) injection 40 mg  40 mg SubCUTAneous Q24H    citalopram (CELEXA) tablet 40 mg  40 mg Oral Daily    divalproex (DEPAKOTE ER) extended release tablet 500 mg  500 mg Oral Daily    sodium chloride flush 0.9 % injection 5-40 mL  5-40 mL IntraVENous 2 times per day    sodium chloride flush 0.9 % injection 5-40 mL  5-40 mL IntraVENous PRN    0.9 % sodium chloride infusion   IntraVENous PRN    ondansetron (ZOFRAN-ODT) disintegrating tablet 4 mg  4 mg Oral Q8H PRN    Or    ondansetron (ZOFRAN) injection 4 mg  4 mg IntraVENous Q6H PRN    polyethylene glycol (GLYCOLAX) packet 17 g  17 g Oral Daily PRN    lactated ringers infusion   IntraVENous Continuous    LORazepam (ATIVAN) tablet 0.5 mg  0.5 mg Oral Q6H PRN    thiamine tablet 100 mg  100 mg Oral Daily    nicotine (NICODERM CQ) 21 MG/24HR 1 patch  1 patch TransDERmal Daily       Signed:  Lesia Watson    Part of this note may have been written by using a voice dictation software. The note has been proof read but may still contain some grammatical/other typographical errors.

## (undated) DEVICE — PROBE ABLAT XL 90DEG ASPIR BPLR RF 1 PC ELECTRD ERGO HNDL

## (undated) DEVICE — PAD,NON-ADHERENT,3X8,STERILE,LF,1/PK: Brand: MEDLINE

## (undated) DEVICE — 4-PORT MANIFOLD: Brand: NEPTUNE 2

## (undated) DEVICE — ACUFEX ACCESS ADVANCED POSITIONING KIT: Brand: ACUFEX

## (undated) DEVICE — SUTURE MCRYL SZ 3-0 L27IN ABSRB UD L19MM PS-2 3/8 CIR PRIM Y427H

## (undated) DEVICE — PAD,ABDOMINAL,5"X9",ST,LF,25/BX: Brand: MEDLINE INDUSTRIES, INC.

## (undated) DEVICE — INTENDED FOR TISSUE SEPARATION, AND OTHER PROCEDURES THAT REQUIRE A SHARP SURGICAL BLADE TO PUNCTURE OR CUT.: Brand: BARD-PARKER ® STAINLESS STEEL BLADES

## (undated) DEVICE — PACK,SHOULDER,DRAPE,POUCH: Brand: MEDLINE

## (undated) DEVICE — TUBING PMP L16FT MAIN DISP FOR AR-6400 AR-6475

## (undated) DEVICE — Device

## (undated) DEVICE — APPLICATOR MEDICATED 26 CC SOLUTION HI LT ORNG CHLORAPREP

## (undated) DEVICE — SUTURE ABSORBABLE BRAIDED 0 CT-1 8X27 IN UD VICRYL JJ41G

## (undated) DEVICE — CANNULA ARTHSCP L5CM DIA8.25MM TRNSLUC THRD FLX W/ NO

## (undated) DEVICE — 3M™ STERI-DRAPE™ U-DRAPE 1015: Brand: STERI-DRAPE™

## (undated) DEVICE — SHOULDER ARTHRO DR KOCH: Brand: MEDLINE INDUSTRIES, INC.

## (undated) DEVICE — SOLUTION IRRIG 3000ML 0.9% SOD CHL USP UROMATIC PLAS CONT

## (undated) DEVICE — KIT PERC INSRT 17GA SPNL NDL 1.1MM NIT GWIRE PORTAL DIL

## (undated) DEVICE — FOAM BUMP ROUND LARGE: Brand: MEDLINE INDUSTRIES, INC.

## (undated) DEVICE — PASSER SUT DIA1.8MM 25DEG R TIGHT CRV STIFF SHFT SHRP

## (undated) DEVICE — KIT CHAIR TRIMANO FOAM W/ SUPP ARM DRP ERGONOMICALLY DESIGNED

## (undated) DEVICE — SUTURE LABRALTAPE L36IN DIA1.5MM NONABSORBABLE WHT BLK AR7276T

## (undated) DEVICE — BLADE SHV L13CM DIA4MM BNE CUT AGG DEB COOLCUT

## (undated) DEVICE — BUR SHV L13CM DIA4MM 8 FLUT OVL FOR RAP AGG BNE RESECT

## (undated) DEVICE — TUBING, SUCTION, 1/4" X 10', STRAIGHT: Brand: MEDLINE